# Patient Record
Sex: MALE | Race: WHITE | NOT HISPANIC OR LATINO | URBAN - METROPOLITAN AREA
[De-identification: names, ages, dates, MRNs, and addresses within clinical notes are randomized per-mention and may not be internally consistent; named-entity substitution may affect disease eponyms.]

---

## 2017-07-03 ENCOUNTER — EMERGENCY (EMERGENCY)
Facility: HOSPITAL | Age: 57
LOS: 0 days | Discharge: LEFT BEFORE DISCHARGE | End: 2017-07-04

## 2017-07-03 DIAGNOSIS — Z88.0 ALLERGY STATUS TO PENICILLIN: ICD-10-CM

## 2017-07-03 DIAGNOSIS — R07.89 OTHER CHEST PAIN: ICD-10-CM

## 2017-07-03 DIAGNOSIS — Z79.899 OTHER LONG TERM (CURRENT) DRUG THERAPY: ICD-10-CM

## 2017-07-03 DIAGNOSIS — F17.200 NICOTINE DEPENDENCE, UNSPECIFIED, UNCOMPLICATED: ICD-10-CM

## 2017-07-03 DIAGNOSIS — J20.9 ACUTE BRONCHITIS, UNSPECIFIED: ICD-10-CM

## 2018-09-07 ENCOUNTER — EMERGENCY (EMERGENCY)
Facility: HOSPITAL | Age: 58
LOS: 0 days | Discharge: HOME | End: 2018-09-07
Attending: EMERGENCY MEDICINE | Admitting: EMERGENCY MEDICINE

## 2018-09-07 VITALS
RESPIRATION RATE: 18 BRPM | HEART RATE: 95 BPM | HEIGHT: 71 IN | WEIGHT: 190.04 LBS | SYSTOLIC BLOOD PRESSURE: 153 MMHG | TEMPERATURE: 98 F | OXYGEN SATURATION: 98 % | DIASTOLIC BLOOD PRESSURE: 90 MMHG

## 2018-09-07 VITALS
TEMPERATURE: 98 F | DIASTOLIC BLOOD PRESSURE: 99 MMHG | OXYGEN SATURATION: 96 % | SYSTOLIC BLOOD PRESSURE: 157 MMHG | RESPIRATION RATE: 18 BRPM

## 2018-09-07 DIAGNOSIS — Z88.0 ALLERGY STATUS TO PENICILLIN: ICD-10-CM

## 2018-09-07 DIAGNOSIS — Z98.1 ARTHRODESIS STATUS: Chronic | ICD-10-CM

## 2018-09-07 DIAGNOSIS — J44.1 CHRONIC OBSTRUCTIVE PULMONARY DISEASE WITH (ACUTE) EXACERBATION: ICD-10-CM

## 2018-09-07 DIAGNOSIS — Z87.891 PERSONAL HISTORY OF NICOTINE DEPENDENCE: ICD-10-CM

## 2018-09-07 DIAGNOSIS — Z79.899 OTHER LONG TERM (CURRENT) DRUG THERAPY: ICD-10-CM

## 2018-09-07 DIAGNOSIS — Z98.1 ARTHRODESIS STATUS: ICD-10-CM

## 2018-09-07 DIAGNOSIS — R06.00 DYSPNEA, UNSPECIFIED: ICD-10-CM

## 2018-09-07 DIAGNOSIS — F41.9 ANXIETY DISORDER, UNSPECIFIED: ICD-10-CM

## 2018-09-07 DIAGNOSIS — Z79.51 LONG TERM (CURRENT) USE OF INHALED STEROIDS: ICD-10-CM

## 2018-09-07 LAB
ALBUMIN SERPL ELPH-MCNC: 4.3 G/DL — SIGNIFICANT CHANGE UP (ref 3.5–5.2)
ALP SERPL-CCNC: 81 U/L — SIGNIFICANT CHANGE UP (ref 30–115)
ALT FLD-CCNC: 15 U/L — SIGNIFICANT CHANGE UP (ref 0–41)
ANION GAP SERPL CALC-SCNC: 9 MMOL/L — SIGNIFICANT CHANGE UP (ref 7–14)
APTT BLD: 27.9 SEC — SIGNIFICANT CHANGE UP (ref 27–39.2)
AST SERPL-CCNC: 13 U/L — SIGNIFICANT CHANGE UP (ref 0–41)
BASE EXCESS BLDV CALC-SCNC: 3.5 MMOL/L — HIGH (ref -2–2)
BASOPHILS # BLD AUTO: 0.05 K/UL — SIGNIFICANT CHANGE UP (ref 0–0.2)
BASOPHILS NFR BLD AUTO: 0.4 % — SIGNIFICANT CHANGE UP (ref 0–1)
BILIRUB SERPL-MCNC: 0.4 MG/DL — SIGNIFICANT CHANGE UP (ref 0.2–1.2)
BUN SERPL-MCNC: 17 MG/DL — SIGNIFICANT CHANGE UP (ref 10–20)
CA-I SERPL-SCNC: 1.15 MMOL/L — SIGNIFICANT CHANGE UP (ref 1.12–1.3)
CALCIUM SERPL-MCNC: 8.8 MG/DL — SIGNIFICANT CHANGE UP (ref 8.5–10.1)
CHLORIDE SERPL-SCNC: 103 MMOL/L — SIGNIFICANT CHANGE UP (ref 98–110)
CO2 SERPL-SCNC: 28 MMOL/L — SIGNIFICANT CHANGE UP (ref 17–32)
CREAT SERPL-MCNC: 0.8 MG/DL — SIGNIFICANT CHANGE UP (ref 0.7–1.5)
EOSINOPHIL # BLD AUTO: 0.16 K/UL — SIGNIFICANT CHANGE UP (ref 0–0.7)
EOSINOPHIL NFR BLD AUTO: 1.4 % — SIGNIFICANT CHANGE UP (ref 0–8)
GAS PNL BLDV: 140 MMOL/L — SIGNIFICANT CHANGE UP (ref 136–145)
GAS PNL BLDV: SIGNIFICANT CHANGE UP
GLUCOSE SERPL-MCNC: 180 MG/DL — HIGH (ref 70–99)
HCO3 BLDV-SCNC: 29 MMOL/L — SIGNIFICANT CHANGE UP (ref 22–29)
HCT VFR BLD CALC: 47.5 % — SIGNIFICANT CHANGE UP (ref 42–52)
HCT VFR BLDA CALC: 53.4 % — HIGH (ref 34–44)
HGB BLD CALC-MCNC: 17.4 G/DL — SIGNIFICANT CHANGE UP (ref 14–18)
HGB BLD-MCNC: 16.3 G/DL — SIGNIFICANT CHANGE UP (ref 14–18)
HOROWITZ INDEX BLDV+IHG-RTO: 21 — SIGNIFICANT CHANGE UP
IMM GRANULOCYTES NFR BLD AUTO: 0.3 % — SIGNIFICANT CHANGE UP (ref 0.1–0.3)
INR BLD: 1.22 RATIO — SIGNIFICANT CHANGE UP (ref 0.65–1.3)
LACTATE BLDV-MCNC: 1.6 MMOL/L — SIGNIFICANT CHANGE UP (ref 0.5–1.6)
LYMPHOCYTES # BLD AUTO: 1.01 K/UL — LOW (ref 1.2–3.4)
LYMPHOCYTES # BLD AUTO: 9 % — LOW (ref 20.5–51.1)
MAGNESIUM SERPL-MCNC: 1.9 MG/DL — SIGNIFICANT CHANGE UP (ref 1.8–2.4)
MCHC RBC-ENTMCNC: 30 PG — SIGNIFICANT CHANGE UP (ref 27–31)
MCHC RBC-ENTMCNC: 34.3 G/DL — SIGNIFICANT CHANGE UP (ref 32–37)
MCV RBC AUTO: 87.5 FL — SIGNIFICANT CHANGE UP (ref 80–94)
MONOCYTES # BLD AUTO: 0.55 K/UL — SIGNIFICANT CHANGE UP (ref 0.1–0.6)
MONOCYTES NFR BLD AUTO: 4.9 % — SIGNIFICANT CHANGE UP (ref 1.7–9.3)
NEUTROPHILS # BLD AUTO: 9.48 K/UL — HIGH (ref 1.4–6.5)
NEUTROPHILS NFR BLD AUTO: 84 % — HIGH (ref 42.2–75.2)
NRBC # BLD: 0 /100 WBCS — SIGNIFICANT CHANGE UP (ref 0–0)
PCO2 BLDV: 44 MMHG — SIGNIFICANT CHANGE UP (ref 41–51)
PH BLDV: 7.42 — SIGNIFICANT CHANGE UP (ref 7.26–7.43)
PLATELET # BLD AUTO: 218 K/UL — SIGNIFICANT CHANGE UP (ref 130–400)
PO2 BLDV: 48 MMHG — HIGH (ref 20–40)
POTASSIUM BLDV-SCNC: 3.4 MMOL/L — SIGNIFICANT CHANGE UP (ref 3.3–5.6)
POTASSIUM SERPL-MCNC: 3.8 MMOL/L — SIGNIFICANT CHANGE UP (ref 3.5–5)
POTASSIUM SERPL-SCNC: 3.8 MMOL/L — SIGNIFICANT CHANGE UP (ref 3.5–5)
PROT SERPL-MCNC: 6.7 G/DL — SIGNIFICANT CHANGE UP (ref 6–8)
PROTHROM AB SERPL-ACNC: 13.2 SEC — HIGH (ref 9.95–12.87)
RBC # BLD: 5.43 M/UL — SIGNIFICANT CHANGE UP (ref 4.7–6.1)
RBC # FLD: 12.8 % — SIGNIFICANT CHANGE UP (ref 11.5–14.5)
SAO2 % BLDV: 88 % — SIGNIFICANT CHANGE UP
SODIUM SERPL-SCNC: 140 MMOL/L — SIGNIFICANT CHANGE UP (ref 135–146)
TROPONIN T SERPL-MCNC: <0.01 NG/ML — SIGNIFICANT CHANGE UP
WBC # BLD: 11.28 K/UL — HIGH (ref 4.8–10.8)
WBC # FLD AUTO: 11.28 K/UL — HIGH (ref 4.8–10.8)

## 2018-09-07 RX ORDER — IPRATROPIUM/ALBUTEROL SULFATE 18-103MCG
3 AEROSOL WITH ADAPTER (GRAM) INHALATION ONCE
Qty: 0 | Refills: 0 | Status: COMPLETED | OUTPATIENT
Start: 2018-09-07 | End: 2018-09-07

## 2018-09-07 RX ORDER — ALBUTEROL 90 UG/1
2 AEROSOL, METERED ORAL EVERY 6 HOURS
Qty: 0 | Refills: 0 | Status: DISCONTINUED | OUTPATIENT
Start: 2018-09-07 | End: 2018-09-07

## 2018-09-07 RX ADMIN — Medication 3 MILLILITER(S): at 13:05

## 2018-09-07 RX ADMIN — ALBUTEROL 2 PUFF(S): 90 AEROSOL, METERED ORAL at 14:46

## 2018-09-07 RX ADMIN — Medication 125 MILLIGRAM(S): at 13:05

## 2018-09-07 RX ADMIN — Medication 3 MILLILITER(S): at 13:22

## 2018-09-07 RX ADMIN — Medication 3 MILLILITER(S): at 12:45

## 2018-09-07 NOTE — ED PROVIDER NOTE - OBJECTIVE STATEMENT
57 yo M recently diagnosed with COPD 6 months ago (quit smoking 6 months ago) presenting with cough and shortness of breath x 3 days. States shortness of breath has been worsening since last night. Has been using Ventolin with no relief. Patient also reports itchy throat. States he was in [place] all weekend and was around a lot of smoke. Also reports sick contacts with people with +URI symptoms. No cp, fever, chills, abdominal pain, nausea, vomiting, diarrhea, back pain, urinary symptoms, headache, dizziness, paresthesias, or weakness.

## 2018-09-07 NOTE — ED ADULT NURSE NOTE - NSIMPLEMENTINTERV_GEN_ALL_ED
Implemented All Universal Safety Interventions:  [place] to call system. Call bell, personal items and telephone within reach. Instruct patient to call for assistance. Room bathroom lighting operational. Non-slip footwear when patient is off stretcher. Physically safe environment: no spills, clutter or unnecessary equipment. Stretcher in lowest position, wheels locked, appropriate side rails in place.

## 2018-09-07 NOTE — ED PROVIDER NOTE - MEDICAL DECISION MAKING DETAILS
much improved.  dx testing reviewed. In my opinion, out patient treatment and follow up is appropriate. Copies of all diagnostic studies were given to patient to aid in follow up.

## 2018-09-07 NOTE — ED PROVIDER NOTE - NS ED ROS FT
Review of Systems:  	•	CONSTITUTIONAL - no fever, no diaphoresis, no chills  	•	SKIN - no rash  	•	ENT - +itchy throat, no congestion  	•	RESPIRATORY - + shortness of breath, + cough  	•	CARDIAC - no chest pain, no palpitations  	•	GI - no abd pain, no nausea, no vomiting  	•	GENITO-URINARY - no dysuria; no hematuria, no increased urinary frequency  	•	MUSCULOSKELETAL - no joint paint, no swelling, no redness  	•	NEUROLOGIC - no weakness, no headache, no paresthesias, no LOC  	All other ROS are negative except as documented in HPI.

## 2018-09-07 NOTE — ED PROVIDER NOTE - PHYSICAL EXAMINATION
VITAL SIGNS: I have reviewed nursing notes and confirm.  CONSTITUTIONAL: Well-developed; well-nourished; in no acute distress.  SKIN: Skin exam is warm and dry, no acute rash.  HEAD: Normocephalic; atraumatic.  EYES: PERRL, EOM intact; conjunctiva and sclera clear.  ENT: No nasal discharge; airway clear. Oropharynx clear.   NECK: Supple; non tender.  CARD: S1, S2 normal; no murmurs, gallops, or rubs. Regular rate and rhythm.  RESP: +Intermittent wheezing with poor airway entry. No accessory muscle use.   ABD: Normal bowel sounds; soft; non-distended; non-tender.   EXT: Normal ROM. No edema. No calf tenderness.   LYMPH: No acute cervical adenopathy.  NEURO: Alert, oriented. Grossly unremarkable. No focal deficits.  PSYCH: Cooperative, appropriate.

## 2018-12-17 ENCOUNTER — EMERGENCY (EMERGENCY)
Facility: HOSPITAL | Age: 58
LOS: 0 days | Discharge: HOME | End: 2018-12-17
Attending: EMERGENCY MEDICINE | Admitting: EMERGENCY MEDICINE

## 2018-12-17 VITALS
SYSTOLIC BLOOD PRESSURE: 191 MMHG | HEIGHT: 78 IN | HEART RATE: 82 BPM | TEMPERATURE: 96 F | DIASTOLIC BLOOD PRESSURE: 105 MMHG | RESPIRATION RATE: 18 BRPM | WEIGHT: 195.11 LBS

## 2018-12-17 VITALS
HEART RATE: 78 BPM | RESPIRATION RATE: 19 BRPM | OXYGEN SATURATION: 95 % | SYSTOLIC BLOOD PRESSURE: 171 MMHG | TEMPERATURE: 98 F | DIASTOLIC BLOOD PRESSURE: 100 MMHG

## 2018-12-17 DIAGNOSIS — Z79.51 LONG TERM (CURRENT) USE OF INHALED STEROIDS: ICD-10-CM

## 2018-12-17 DIAGNOSIS — Z98.1 ARTHRODESIS STATUS: Chronic | ICD-10-CM

## 2018-12-17 DIAGNOSIS — Z79.52 LONG TERM (CURRENT) USE OF SYSTEMIC STEROIDS: ICD-10-CM

## 2018-12-17 DIAGNOSIS — Z79.2 LONG TERM (CURRENT) USE OF ANTIBIOTICS: ICD-10-CM

## 2018-12-17 DIAGNOSIS — J44.1 CHRONIC OBSTRUCTIVE PULMONARY DISEASE WITH (ACUTE) EXACERBATION: ICD-10-CM

## 2018-12-17 DIAGNOSIS — J44.9 CHRONIC OBSTRUCTIVE PULMONARY DISEASE, UNSPECIFIED: ICD-10-CM

## 2018-12-17 DIAGNOSIS — Z79.899 OTHER LONG TERM (CURRENT) DRUG THERAPY: ICD-10-CM

## 2018-12-17 DIAGNOSIS — Z88.0 ALLERGY STATUS TO PENICILLIN: ICD-10-CM

## 2018-12-17 DIAGNOSIS — Z87.891 PERSONAL HISTORY OF NICOTINE DEPENDENCE: ICD-10-CM

## 2018-12-17 DIAGNOSIS — R06.02 SHORTNESS OF BREATH: ICD-10-CM

## 2018-12-17 RX ORDER — ALBUTEROL 90 UG/1
1 AEROSOL, METERED ORAL ONCE
Qty: 0 | Refills: 0 | Status: COMPLETED | OUTPATIENT
Start: 2018-12-17 | End: 2018-12-17

## 2018-12-17 RX ORDER — AZITHROMYCIN 500 MG/1
1 TABLET, FILM COATED ORAL
Qty: 4 | Refills: 0
Start: 2018-12-17 | End: 2018-12-20

## 2018-12-17 RX ORDER — IPRATROPIUM/ALBUTEROL SULFATE 18-103MCG
3 AEROSOL WITH ADAPTER (GRAM) INHALATION ONCE
Qty: 0 | Refills: 0 | Status: COMPLETED | OUTPATIENT
Start: 2018-12-17 | End: 2018-12-17

## 2018-12-17 RX ORDER — ALBUTEROL 90 UG/1
2 AEROSOL, METERED ORAL
Qty: 1 | Refills: 0
Start: 2018-12-17 | End: 2018-12-26

## 2018-12-17 RX ORDER — AZITHROMYCIN 500 MG/1
500 TABLET, FILM COATED ORAL ONCE
Qty: 0 | Refills: 0 | Status: COMPLETED | OUTPATIENT
Start: 2018-12-17 | End: 2018-12-17

## 2018-12-17 RX ADMIN — AZITHROMYCIN 500 MILLIGRAM(S): 500 TABLET, FILM COATED ORAL at 17:41

## 2018-12-17 RX ADMIN — ALBUTEROL 1 PUFF(S): 90 AEROSOL, METERED ORAL at 17:27

## 2018-12-17 RX ADMIN — Medication 50 MILLIGRAM(S): at 17:28

## 2018-12-17 RX ADMIN — Medication 3 MILLILITER(S): at 17:22

## 2018-12-17 NOTE — ED PROVIDER NOTE - PHYSICAL EXAMINATION
CONSTITUTIONAL: Well-developed; well-nourished; in no acute distress  SKIN: warm, dry  HEAD: Normocephalic; atraumatic  EYES: PERRL, EOMI, no conjunctival erythema  ENT: No nasal discharge; airway clear, mucous membranes moist  NECK: Supple; non tender, FROM  CARD: +S1, S2 no murmurs, gallops, or rubs. Regular rate and rhythm. radial 2+  RESP: b/l wheezing with poor air entry b/l, tachypneic but no use of accessory muscles for respiration   EXT: moves all extremities, ambulates wo assistance No clubbing, cyanosis or edema.   NEURO: Alert, oriented, grossly unremarkable, no focal deficits, cn ii-xii grossly intact  PSYCH: Cooperative, appropriate

## 2018-12-17 NOTE — ED PROVIDER NOTE - OBJECTIVE STATEMENT
57yo m pmhx copd, former smoker presents cc sob, copd exacerbation x 3 weeks. pt states he has been using his ventolin inhaler multiple times a day but ran out last night. cannot recall inciting event. reports increased sputum production during this time period as well. no fevers, nausea, vomiting.

## 2018-12-17 NOTE — ED PROVIDER NOTE - PROGRESS NOTE DETAILS
pt reevaluated, symptoms improved, lungs ctabl. Patient to be discharged from ED. Any available test results were discussed with patient and/or family. Verbal instructions given, including instructions to return to ED immediately for any new, worsening, or concerning symptoms. Patient endorsed understanding. Written discharge instructions additionally given, including follow-up plan.

## 2018-12-17 NOTE — ED PROVIDER NOTE - MEDICAL DECISION MAKING DETAILS
58 male with COPD here for bronchitis / med refill, got imaging and will discharge to outpatient management

## 2018-12-17 NOTE — ED ADULT NURSE NOTE - NSIMPLEMENTINTERV_GEN_ALL_ED
Implemented All Universal Safety Interventions:  [place] to call system. Call bell, personal items and telephone within reach. Instruct patient to call for assistance. Room bathroom lighting operational. Non-slip footwear when patient is off stretcher. Physically safe environment: no spills, clutter or unnecessary equipment. Stretcher in lowest position, wheels locked, appropriate side rails in place.

## 2018-12-17 NOTE — ED PROVIDER NOTE - NSFOLLOWUPINSTRUCTIONS_ED_ALL_ED_FT
follow up with your pulmonologist and your primary care doctor in 1-3 days    Chronic Obstructive Pulmonary Disease    Chronic obstructive pulmonary disease (COPD) is a lung condition in which airflow from the lungs is limited. Causes include smoking, secondhand smoke exposure, genetics, or recurrent infections. Take all medicines (inhaled or pills) as directed by your health care provider. Avoid exposure to irritants such as smoke, chemicals, and fumes that aggravate your breathing.    If you are a smoker, the most important thing that you can do is stop smoking. Continuing to smoke will cause further lung damage and breathing trouble. Ask your health care provider for help with quitting smoking.    SEEK IMMEDIATE MEDICAL CARE IF YOU HAVE ANY OF THE FOLLOWING SYMPTOMS: shortness of breath at rest or when talking, bluish discoloration of lips, skin, fever, worsening cough, unexplained chest pain, or lightheadedness/dizziness.     Shortness of breath    Shortness of breath (dyspnea) means you have trouble breathing and could indicate a medical problem. Causes include lung disease, heart disease, low amount of red blood cells (anemia), poor physical fitness, being overweight, smoking, etc. Your health care provider today may not be able to find a cause for your shortness of breath after your exam. In this case, it is important to have a follow-up exam with your primary care physician as instructed. If medicines were prescribed, take them as directed for the full length of time directed. Refrain from tobacco products.    SEEK IMMEDIATE MEDICAL CARE IF YOU HAVE ANY OF THE FOLLOWING SYMPTOMS: worsening shortness of breath, chest pain, back pain, abdominal pain, fever, coughing up blood, lightheadedness/dizziness.

## 2018-12-17 NOTE — ED PROVIDER NOTE - NS ED ROS FT
General: No fevers, chills, nausea, vomiting  Eyes:  No visual changes, eye pain or discharge.  ENMT:  No hearing changes, pain,   Cardiac:  No chest pain, +SOB, no edema.  Respiratory:  +cough  GI:  No nausea, vomiting, or abdominal pain.  :  No dysuria  MS:  No  back pain.  Neuro:  No headache or weakness.  No LOC.  Skin:  No skin rash.   Endocrine: No history of thyroid disease or diabetes.

## 2018-12-17 NOTE — ED PROVIDER NOTE - ATTENDING CONTRIBUTION TO CARE
I personally evaluated the patient. I reviewed the Resident’s or Physician Assistant’s note (as assigned above), and agree with the findings and plan except as documented in my note.     58 male here for evaluation of cough and bronchitis. 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 No fever or chills. Ran out of his rescue inhaler.     PE: male in no distress. CHEST: normal work of breathing. ronchi bilaterally. no accessory muscle use. CV: pulses intact. SKIN: no pallor no diaphoresis    Impression: bronchitis COPD    Plan: imaging, reevaluation, antibiotics, steroids, outpatient management

## 2018-12-17 NOTE — ED PROVIDER NOTE - CARE PLAN
Principal Discharge DX:	COPD exacerbation  Secondary Diagnosis:	COPD (chronic obstructive pulmonary disease)

## 2018-12-18 PROBLEM — F41.9 ANXIETY DISORDER, UNSPECIFIED: Chronic | Status: ACTIVE | Noted: 2018-09-07

## 2018-12-18 PROBLEM — J44.9 CHRONIC OBSTRUCTIVE PULMONARY DISEASE, UNSPECIFIED: Chronic | Status: ACTIVE | Noted: 2018-09-07

## 2019-08-05 ENCOUNTER — EMERGENCY (EMERGENCY)
Facility: HOSPITAL | Age: 59
LOS: 0 days | Discharge: HOME | End: 2019-08-05
Attending: EMERGENCY MEDICINE | Admitting: EMERGENCY MEDICINE
Payer: MEDICARE

## 2019-08-05 VITALS
RESPIRATION RATE: 20 BRPM | DIASTOLIC BLOOD PRESSURE: 88 MMHG | SYSTOLIC BLOOD PRESSURE: 138 MMHG | HEART RATE: 78 BPM | TEMPERATURE: 98 F | OXYGEN SATURATION: 100 %

## 2019-08-05 VITALS
RESPIRATION RATE: 18 BRPM | DIASTOLIC BLOOD PRESSURE: 100 MMHG | OXYGEN SATURATION: 95 % | WEIGHT: 190.04 LBS | TEMPERATURE: 97 F | HEART RATE: 86 BPM | SYSTOLIC BLOOD PRESSURE: 141 MMHG

## 2019-08-05 DIAGNOSIS — J44.9 CHRONIC OBSTRUCTIVE PULMONARY DISEASE, UNSPECIFIED: ICD-10-CM

## 2019-08-05 DIAGNOSIS — Z98.1 ARTHRODESIS STATUS: Chronic | ICD-10-CM

## 2019-08-05 DIAGNOSIS — Z88.0 ALLERGY STATUS TO PENICILLIN: ICD-10-CM

## 2019-08-05 DIAGNOSIS — Z87.891 PERSONAL HISTORY OF NICOTINE DEPENDENCE: ICD-10-CM

## 2019-08-05 DIAGNOSIS — R06.02 SHORTNESS OF BREATH: ICD-10-CM

## 2019-08-05 LAB
ALBUMIN SERPL ELPH-MCNC: 4.4 G/DL — SIGNIFICANT CHANGE UP (ref 3.5–5.2)
ALP SERPL-CCNC: 77 U/L — SIGNIFICANT CHANGE UP (ref 30–115)
ALT FLD-CCNC: 19 U/L — SIGNIFICANT CHANGE UP (ref 0–41)
ANION GAP SERPL CALC-SCNC: 9 MMOL/L — SIGNIFICANT CHANGE UP (ref 7–14)
AST SERPL-CCNC: 13 U/L — SIGNIFICANT CHANGE UP (ref 0–41)
BILIRUB SERPL-MCNC: 0.3 MG/DL — SIGNIFICANT CHANGE UP (ref 0.2–1.2)
BUN SERPL-MCNC: 24 MG/DL — HIGH (ref 10–20)
CALCIUM SERPL-MCNC: 9.3 MG/DL — SIGNIFICANT CHANGE UP (ref 8.5–10.1)
CHLORIDE SERPL-SCNC: 108 MMOL/L — SIGNIFICANT CHANGE UP (ref 98–110)
CO2 SERPL-SCNC: 29 MMOL/L — SIGNIFICANT CHANGE UP (ref 17–32)
CREAT SERPL-MCNC: 0.8 MG/DL — SIGNIFICANT CHANGE UP (ref 0.7–1.5)
GLUCOSE SERPL-MCNC: 101 MG/DL — HIGH (ref 70–99)
HCT VFR BLD CALC: 45.6 % — SIGNIFICANT CHANGE UP (ref 42–52)
HGB BLD-MCNC: 15.7 G/DL — SIGNIFICANT CHANGE UP (ref 14–18)
MCHC RBC-ENTMCNC: 31 PG — SIGNIFICANT CHANGE UP (ref 27–31)
MCHC RBC-ENTMCNC: 34.4 G/DL — SIGNIFICANT CHANGE UP (ref 32–37)
MCV RBC AUTO: 89.9 FL — SIGNIFICANT CHANGE UP (ref 80–94)
NRBC # BLD: 0 /100 WBCS — SIGNIFICANT CHANGE UP (ref 0–0)
PLATELET # BLD AUTO: 205 K/UL — SIGNIFICANT CHANGE UP (ref 130–400)
POTASSIUM SERPL-MCNC: 4.2 MMOL/L — SIGNIFICANT CHANGE UP (ref 3.5–5)
POTASSIUM SERPL-SCNC: 4.2 MMOL/L — SIGNIFICANT CHANGE UP (ref 3.5–5)
PROT SERPL-MCNC: 6.3 G/DL — SIGNIFICANT CHANGE UP (ref 6–8)
RBC # BLD: 5.07 M/UL — SIGNIFICANT CHANGE UP (ref 4.7–6.1)
RBC # FLD: 12.6 % — SIGNIFICANT CHANGE UP (ref 11.5–14.5)
SODIUM SERPL-SCNC: 146 MMOL/L — SIGNIFICANT CHANGE UP (ref 135–146)
WBC # BLD: 6.41 K/UL — SIGNIFICANT CHANGE UP (ref 4.8–10.8)
WBC # FLD AUTO: 6.41 K/UL — SIGNIFICANT CHANGE UP (ref 4.8–10.8)

## 2019-08-05 PROCEDURE — 71046 X-RAY EXAM CHEST 2 VIEWS: CPT | Mod: 26

## 2019-08-05 PROCEDURE — 99284 EMERGENCY DEPT VISIT MOD MDM: CPT

## 2019-08-05 RX ORDER — IPRATROPIUM/ALBUTEROL SULFATE 18-103MCG
3 AEROSOL WITH ADAPTER (GRAM) INHALATION ONCE
Refills: 0 | Status: COMPLETED | OUTPATIENT
Start: 2019-08-05 | End: 2019-08-05

## 2019-08-05 RX ORDER — ALBUTEROL 90 UG/1
1 AEROSOL, METERED ORAL ONCE
Refills: 0 | Status: COMPLETED | OUTPATIENT
Start: 2019-08-05 | End: 2019-08-05

## 2019-08-05 RX ORDER — MAGNESIUM SULFATE 500 MG/ML
2 VIAL (ML) INJECTION ONCE
Refills: 0 | Status: COMPLETED | OUTPATIENT
Start: 2019-08-05 | End: 2019-08-05

## 2019-08-05 RX ADMIN — Medication 50 GRAM(S): at 19:47

## 2019-08-05 RX ADMIN — ALBUTEROL 1 PUFF(S): 90 AEROSOL, METERED ORAL at 21:27

## 2019-08-05 RX ADMIN — Medication 3 MILLILITER(S): at 19:46

## 2019-08-05 RX ADMIN — Medication 125 MILLIGRAM(S): at 19:46

## 2019-08-05 NOTE — ED PROVIDER NOTE - PROGRESS NOTE DETAILS
Pt lungs clear. Results d/w patient and family and copies of results provided.  Pt instructed to return if any worsening symptoms or concerns.  Discussed with patient follow up and care plan. They verbalize understanding all discussed and all questions answered..

## 2019-08-05 NOTE — ED PROVIDER NOTE - CLINICAL SUMMARY MEDICAL DECISION MAKING FREE TEXT BOX
59yM hx of COPD  on ventolin only  (pulm Dr weems),  p/w  SOb wheezing x 3 days -  not relieved with his  Inhaler.  no chest pain leg pain swelling   or PE risk factors CXR no ptx no opacity  REsp  b/l wheezing  -   labs reviewed,   steroids and mg  given  pt  feels much better   -  dcd with steroids and referral for  pulmonary

## 2019-08-05 NOTE — ED PROVIDER NOTE - OBJECTIVE STATEMENT
59 year old male past medical history of COPD states having exacerbation and states that he has been seen by PMD was given inhaler and medrol dose pack last week but states not helping.

## 2019-08-05 NOTE — ED PROVIDER NOTE - NSFOLLOWUPINSTRUCTIONS_ED_ALL_ED_FT
Follow up with your primary care doctor and your Pulmonologist in 1-2 days     Chronic Obstructive Pulmonary Disease  ImageChronic obstructive pulmonary disease (COPD) is a long-term (chronic) condition that affects the lungs. COPD is a general term that can be used to describe many different lung problems that cause lung swelling (inflammation) and limit airflow, including chronic bronchitis and emphysema. If you have COPD, your lung function will probably never return to normal. In most cases, it gets worse over time. However, there are steps you can take to slow the progression of the disease and improve your quality of life.    What are the causes?  This condition may be caused by:    Smoking. This is the most common cause.  Certain genes passed down through families.    What increases the risk?  The following factors may make you more likely to develop this condition:    Secondhand smoke from cigarettes, pipes, or cigars.  Exposure to chemicals and other irritants such as fumes and dust in the work environment.  Chronic lung conditions or infections.    What are the signs or symptoms?  Symptoms of this condition include:    Shortness of breath, especially during physical activity.  Chronic cough with a large amount of thick mucus. Sometimes the cough may not have any mucus (dry cough).  Wheezing.  Rapid breaths.  Gray or bluish discoloration (cyanosis) of the skin, especially in your fingers, toes, or lips.  Feeling tired (fatigue).  Weight loss.  Chest tightness.  Frequent infections.  Episodes when breathing symptoms become much worse (exacerbations).  Swelling in the ankles, feet, or legs. This may occur in later stages of the disease.    How is this diagnosed?  This condition is diagnosed based on:    Your medical history.  A physical exam.    You may also have tests, including:    Lung (pulmonary) function tests. This may include a spirometry test, which measures your ability to exhale properly.  Chest X-ray.  CT scan.  Blood tests.    How is this treated?  This condition may be treated with:    Medicines. These may include inhaled rescue medicines to treat acute exacerbations as well as long-term, or maintenance, medicines to prevent flare-ups of COPD.    Bronchodilators help treat COPD by dilating the airways to allow increased airflow and make your breathing more comfortable.  Steroids can reduce airway inflammation and help prevent exacerbations.    Smoking cessation. If you smoke, your health care provider may ask you to quit, and may also recommend therapy or replacement products to help you quit.  Pulmonary rehabilitation. This may involve working with a team of health care providers and specialists, such as respiratory, occupational, and physical therapists.  Exercise and physical activity. These are beneficial for nearly all people with COPD.  Nutrition therapy to gain weight, if you are underweight.  Oxygen. Supplemental oxygen therapy is only helpful if you have a low oxygen level in your blood (hypoxemia).  Lung surgery or transplant.  Palliative care. This is to help people with COPD feel comfortable when treatment is no longer working.    Follow these instructions at home:  Medicines     Take over-the-counter and prescription medicines (inhaled or pills) only as told by your health care provider.  Talk to your health care provider before taking any cough or allergy medicines. You may need to avoid certain medicines that dry out your airways.  Lifestyle     If you are a smoker, the most important thing that you can do is to stop smoking. Do not use any products that contain nicotine or tobacco, such as cigarettes and e-cigarettes. If you need help quitting, ask your health care provider. Continuing to smoke will cause the disease to progress faster.  Avoid exposure to things that irritate your lungs, such as smoke, chemicals, and fumes.  Stay active, but balance activity with periods of rest. Exercise and physical activity will help you maintain your ability to do things you want to do.  Learn and use relaxation techniques to manage stress and to control your breathing.  Get the right amount of sleep and get quality sleep. Most adults need 7 or more hours per night.  Eat healthy foods. Eating smaller, more frequent meals and resting before meals may help you maintain your strength.  Controlled breathing     Learn and use controlled breathing techniques as directed by your health care provider. Controlled breathing techniques include:    Pursed lip breathing. Start by breathing in (inhaling) through your nose for 1 second. Then, purse your lips as if you were going to whistle and breathe out (exhale) through the pursed lips for 2 seconds.  Diaphragmatic breathing. Start by putting one hand on your abdomen just above your waist. Inhale slowly through your nose. The hand on your abdomen should move out. Then purse your lips and exhale slowly. You should be able to feel the hand on your abdomen moving in as you exhale.    Controlled coughing     Learn and use controlled coughing to clear mucus from your lungs. Controlled coughing is a series of short, progressive coughs. The steps of controlled coughing are:    Lean your head slightly forward.    Breathe in deeply using diaphragmatic breathing.    Try to hold your breath for 3 seconds.    Keep your mouth slightly open while coughing twice.    Spit any mucus out into a tissue.    Rest and repeat the steps once or twice as needed.      General instructions     Make sure you receive all the vaccines that your health care provider recommends, especially the pneumococcal and influenza vaccines. Preventing infection and hospitalization is very important when you have COPD.  Use oxygen therapy and pulmonary rehabilitation if directed to by your health care provider. If you require home oxygen therapy, ask your health care provider whether you should purchase a pulse oximeter to measure your oxygen level at home.  Work with your health care provider to develop a COPD action plan. This will help you know what steps to take if your condition gets worse.  Keep other chronic health conditions under control as told by your health care provider.  Avoid extreme temperature and humidity changes.  Avoid contact with people who have an illness that spreads from person to person (is contagious), such as viral infections or pneumonia.  Keep all follow-up visits as told by your health care provider. This is important.  Contact a health care provider if:  You are coughing up more mucus than usual.  There is a change in the color or thickness of your mucus.  Your breathing is more labored than usual.  Your breathing is faster than usual.  You have difficulty sleeping.  You need to use your rescue medicines or inhalers more often than expected.  You have trouble doing routine activities such as getting dressed or walking around the house.  Get help right away if:  You have shortness of breath while you are resting.  You have shortness of breath that prevents you from:    Being able to talk.  Performing your usual physical activities.    You have chest pain lasting longer than 5 minutes.  Your skin color is more blue (cyanotic) than usual.  You measure low oxygen saturations for longer than 5 minutes with a pulse oximeter.  You have a fever.  You feel too tired to breathe normally.  Summary  Chronic obstructive pulmonary disease (COPD) is a long-term (chronic) condition that affects the lungs.  Your lung function will probably never return to normal. In most cases, it gets worse over time. However, there are steps you can take to slow the progression of the disease and improve your quality of life.  Treatment for COPD may include taking medicines, quitting smoking, pulmonary rehabilitation, and changes to diet and exercise. As the disease progresses, you may need oxygen therapy, a lung transplant, or palliative care.  To help manage your condition, do not smoke, avoid exposure to things that irritate your lungs, stay up to date on all vaccines, and follow your health care provider's instructions for taking medicines.  This information is not intended to replace advice given to you by your health care provider. Make sure you discuss any questions you have with your health care provider.

## 2019-08-05 NOTE — ED PROVIDER NOTE - PHYSICAL EXAMINATION
Physical Exam    Vital Signs: I have reviewed the initial vital signs.  Constitutional: well-nourished, appears stated age, no acute distress  Eyes: Conjunctiva pink, Sclera clear, PERRLA, EOMI.  Cardiovascular: S1 and S2, regular rate, regular rhythm, well-perfused extremities, radial pulses equal and 2+  Respiratory: unlabored respiratory effort, + bilaterally wheezing and diminished at bases.  Gastrointestinal: soft, non-tender abdomen, no pulsatile mass, normal bowl sounds  Musculoskeletal: supple neck, no lower extremity edema, no midline tenderness  Integumentary: warm, dry, no rash  Neurologic: awake, alert, cranial nerves II-XII grossly intact, extremities’ motor and sensory functions grossly intact  Psychiatric: appropriate mood, appropriate affect

## 2019-08-05 NOTE — ED PROVIDER NOTE - CARE PROVIDER_API CALL
Abel Hawkins)  Critical Care Medicine; Internal Medicine; Pulmonary Disease; Sleep Medicine  [address] [place] [address]  Phone: [phone]  Fax: [phone]  Follow Up Time: 1-3 Days

## 2019-08-05 NOTE — ED ADULT NURSE NOTE - NSIMPLEMENTINTERV_GEN_ALL_ED
Implemented All Fall with Harm Risk Interventions:  [place] to call system. Call bell, personal items and telephone within reach. Instruct patient to call for assistance. Room bathroom lighting operational. Non-slip footwear when patient is off stretcher. Physically safe environment: no spills, clutter or unnecessary equipment. Stretcher in lowest position, wheels locked, appropriate side rails in place. Provide visual cue, wrist band, yellow gown, etc. Monitor gait and stability. Monitor for mental status changes and reorient to person, place, and time. Review medications for side effects contributing to fall risk. Reinforce activity limits and safety measures with patient and family. Provide visual clues: red socks.

## 2019-10-02 PROBLEM — Z00.00 ENCOUNTER FOR PREVENTIVE HEALTH EXAMINATION: Status: ACTIVE | Noted: 2019-10-02

## 2019-10-09 ENCOUNTER — APPOINTMENT (OUTPATIENT)
Dept: CARDIOTHORACIC SURGERY | Facility: CLINIC | Age: 59
End: 2019-10-09
Payer: MEDICARE

## 2019-10-09 VITALS
HEIGHT: 70 IN | HEART RATE: 80 BPM | DIASTOLIC BLOOD PRESSURE: 88 MMHG | SYSTOLIC BLOOD PRESSURE: 139 MMHG | RESPIRATION RATE: 13 BRPM | OXYGEN SATURATION: 96 % | TEMPERATURE: 98.4 F | BODY MASS INDEX: 27.2 KG/M2 | WEIGHT: 190 LBS

## 2019-10-09 DIAGNOSIS — Z82.49 FAMILY HISTORY OF ISCHEMIC HEART DISEASE AND OTHER DISEASES OF THE CIRCULATORY SYSTEM: ICD-10-CM

## 2019-10-09 DIAGNOSIS — Z87.898 PERSONAL HISTORY OF OTHER SPECIFIED CONDITIONS: ICD-10-CM

## 2019-10-09 DIAGNOSIS — R94.2 ABNORMAL RESULTS OF PULMONARY FUNCTION STUDIES: ICD-10-CM

## 2019-10-09 DIAGNOSIS — Z87.09 PERSONAL HISTORY OF OTHER DISEASES OF THE RESPIRATORY SYSTEM: ICD-10-CM

## 2019-10-09 DIAGNOSIS — R91.8 OTHER NONSPECIFIC ABNORMAL FINDING OF LUNG FIELD: ICD-10-CM

## 2019-10-09 DIAGNOSIS — R25.2 CRAMP AND SPASM: ICD-10-CM

## 2019-10-09 DIAGNOSIS — Z78.9 OTHER SPECIFIED HEALTH STATUS: ICD-10-CM

## 2019-10-09 DIAGNOSIS — Z82.5 FAMILY HISTORY OF ASTHMA AND OTHER CHRONIC LOWER RESPIRATORY DISEASES: ICD-10-CM

## 2019-10-09 PROCEDURE — 99205 OFFICE O/P NEW HI 60 MIN: CPT

## 2019-10-09 RX ORDER — FLUTICASONE FUROATE, UMECLIDINIUM BROMIDE AND VILANTEROL TRIFENATATE 100; 62.5; 25 UG/1; UG/1; UG/1
100-62.5-25 POWDER RESPIRATORY (INHALATION)
Refills: 0 | Status: ACTIVE | COMMUNITY

## 2019-10-14 NOTE — PHYSICAL EXAM
[General Appearance - Alert] : alert [Sclera] : the sclera and conjunctiva were normal [General Appearance - In No Acute Distress] : in no acute distress [PERRL With Normal Accommodation] : pupils were equal in size, round, and reactive to light [Extraocular Movements] : extraocular movements were intact [Heart Rate And Rhythm] : heart rate was normal and rhythm regular [Heart Sounds] : normal S1 and S2 [Heart Sounds Gallop] : no gallops [Murmurs] : no murmurs [Heart Sounds Pericardial Friction Rub] : no pericardial rub [Deep Tendon Reflexes (DTR)] : deep tendon reflexes were 2+ and symmetric [Sensation] : the sensory exam was normal to light touch and pinprick [No Focal Deficits] : no focal deficits [Oriented To Time, Place, And Person] : oriented to person, place, and time [Affect] : the affect was normal [Impaired Insight] : insight and judgment were intact [] : no respiratory distress [Respiration, Rhythm And Depth] : normal respiratory rhythm and effort [FreeTextEntry1] : diminished

## 2019-10-14 NOTE — DATA REVIEWED
[FreeTextEntry1] : Pulmonary Function Analysis - 10/1/19\par FEV1:     Pre: 42\par               Post: 41\par DLCO:  93\par \par ================================================================================\par EXAM:  PET CT FDG SKULL BASE - 9/13/19\par \par \par IMPRESSION:\par \par 1. FDG-avid 2.2 x 2.3 cm spiculated mass is identified in the left upper lobe adjacent to the aortic arch, representing malignancy.\par \par 2. No other evidence of abnormal focal hypermetabolic uptake.\par \par 3. 5 mm nodule in the right lower lobe. The lesion is below the resolution of PET scan.\par \par 4. FDG non-avid 1.7 x 2.2 cm benign hypodense right adrenal adenoma.\par \par \par \par Diagnostic confidence level used in this report:\par \par Consistent with/compatible with or no modifier - greater than 98%\par Most likely - greater than 90%\par Likely/probably - greater than 75%\par Possibly 50%\par Less likely - less than 25%\par Unlikely - less than 5%\par \par \par F-18 FDG PET/CT SCAN FROM THE SKULL BASE TO THE MID THIGHS\par \par AGENT:\par \par 13.6 mCi F18-FDG, IV via the left antecubital vein.\par \par HISTORY:\par \par 59 year old male with suspicious left upper lobe spiculated mass seen on the recent CT. 30 pack year smoking history, quit smoking 8 months ago.\par \par EXAM CATEGORY:\par \par Solitary lung nodule\par \par TECHNIQUE:\par \par 60 minutes following injection of the radiopharmaceutical, PET/CT imaging was performed from the skull base to thighs. Fasting serum glucose was 88 mg/dL prior to injection. Oral or IV contrast was not given. All SUV values reported represent maximum SUV (SUV max) unless otherwise specified.\par \par COMPARISON:\par \par 8/31/2019 CT scan.\par \par SOFT TISSUE FINDINGS:\par \par HEAD AND NECK:\par \par Diffuse mild FDG activity in the parotid glands may represent postinflammatory change vs. physiologic activity. Otherwise, normal physiologic FDG uptake is noted in the visualized portion of the brain and soft tissues of the head and neck.\par \par CT images demonstrate no evidence of cervical adenopathy. The thyroid gland is unremarkable. The visualized portion of the paranasal sinuses are aerated.\par \par CHEST:\par \par FDG-avid 2.2 x 2.3 cm spiculated mass is identified in the left upper lobe adjacent to the aortic arch with SUV 12.4 (image 71), representing malignancy.\par \par A 5 mm nodule in the right lower lobe (image 99) and punctate subpleural nodule in the right upper lobe anteriorly (image 66) are again seen. The central tracheobronchial tree is patent. Emphysematous changes are present.\par \par Normal FDG activity is visualized in the right lung, mediastinum and chest wall.\par \par CT images demonstrate FDG non-avid right paratracheal, precarinal and aortopulmonary window lymph nodes measuring up to 1 cm in short axis. There is no evidence of hilar or axillary adenopathy. The heart is not enlarged. There is no evidence of pneumothorax, pleural or pericardial effusion.\par \par ABDOMEN AND PELVIS:\par \par Physiologic FDG activity is visualized in all regions.\par \par CT images demonstrate no evidence of significant adenopathy within the abdomen and pelvis. The liver and spleen are not enlarged. The gallbladder and left adrenal gland are unremarkable. There is no evidence of pancreatic mass on unenhanced CT. No evidence of obstructing stone or hydronephrosis is seen. FDG non-avid 1.7 x 2.2 cm hypodense right adrenal adenoma, splenule, enlarged prostate gland and bilateral small inguinal hernias containing fat are present.\par \par PROXIMAL THIGHS:\par \par Normal FDG activity is present in the soft tissues of the proximal thighs.\par \par SKELETAL FINDINGS:\par \par Normal FDG activity is noted in the axial skeleton and visualized portion of the appendicular skeleton.\par \par CT images demonstrate C5-C7 spinal hardware and degenerative changes in the spine. There is no evidence of aggressive lytic or blastic lesion.\par \par \par ====================================================================================\par EXAM:  THORAX^RR_CT_RED_MALE_CHEST (ADULT) - 8/31/19\par \par \par IMPRESSION:\par \par Spiculated mass medial left upper lobe which may be adherent to the aortic arch.\par 5 mm nodule right lower lobe. Metastatic focus not excluded. It is possible this can be assessed with PET/CT.\par Borderline size precarinal adenopathy. No obvious hilar nodes.\par Benign right adrenal mass.\par \par \par \par \par MASS LEFT UPPER LOBE SUSPICIOUS FOR NEOPLASM.\par \par CT CHEST WITHOUT IV CONTRAST\par \par CLINICAL INDICATION: History of COPD. No known primary. History of smoking. Not further specified.\par \par COMPARISON: No previous\par \par TECHNIQUE: Noncontrast chest CT was performed. Multiplanar CT and HRCT images were reviewed.\par \par FINDINGS:\par \par LUNGS, AIRWAYS: Trachea main and visualized segmental bronchi patent. Mild airway disease.\par Mild centrilobular emphysema. No interstitial disease.\par A spiculated mass in the left upper lobe. It is adjacent to the aortic arch. It measures 2.1 x 1.6 cm. It is suspicious for neoplasm.\par Punctate pleural-based nodule right upper lobe anteriorly in image 15.\par There is a 5 mm nodule in the anterior aspect of the right upper lobe in series 5 image 46. This may represent a metastatic focus. It is possible it may be assessed by PET/CT.\par \par \par PLEURA: No pleural fluid.\par \par MEDIASTINUM, FRANKIE, LYMPH NODES: No enlarged axillary adenopathy. Precarinal adenopathy short axis X mm. No subcarinal nodes. No obvious hilar nodes. Thoracic esophagus unremarkable.\par \par HEART AND VESSELS: Heart size normal. No pericardial fluid. Ascending aorta is normal in caliber at 3.7 cm. Descending aorta also normal 2.4 cm. The left upper lobe mass is adjacent and possibly adherent to the aortic arch.\par \par UPPER ABDOMEN: Right adrenal nodule better seen in the coronal measuring 1.4 cm. Its attenuation values are -11 which would favor benign etiology. Left adrenal normal\par \par BONES AND SOFT TISSUES: Soft tissues normal. Lower cervical spine surgery. No aggressive bony lesion.\par \par LOWER NECK: No abnormality.\par

## 2019-10-14 NOTE — ASSESSMENT
[FreeTextEntry1] : Mr. CHERI WELCH is a 59 year M who presents to our office today for a consultation for a lung mass  referred to our office by Dr. Alireza Fernandez. This is spiculated and PET positive, the suspicion is high for lung cancer.  His images were reviewed in multi-disciplinary conference and he was thought to represent a very difficult IR biopsy, with which I would agree.  To that end he would need to be a surgical biopsy (wedge resection vs direct VATS needle biopsy) and completion lobectomy if necessary/indicated.  Radiologic images reviewed. Surgical treatment options discussed at length with the patient and his ex-wife in accompaniment. He states that he would like to include his wife in decision making as she is an RN at [employer]. \par At present he will need a cardiopulmonary stress test with VO2 max, cardiac clearance with Dr. Jamie Turner, pre-surgical testing, all lof which we will help to facilitate, and once completed he will proceed to the OR on Nov 7th for a Robotic Left thorascopic lung biopsy, possible resection.  Of note, the patient shows fairly diminished FEV1 but preserved DLCO.  I performed stair climb with him in the office during which he bounded up 3 flights of stairs, did not exhibit shortness of breath, and offered to do the stairs again 2 at a time if that would help my decision making.  In spite of the diminished FEV1, he objectively presents adequate physical reserve for resection.  He does note that his breathing is much improved since addition of inhaler.\par

## 2019-10-14 NOTE — HISTORY OF PRESENT ILLNESS
[FreeTextEntry1] : 59 year M who presents to our office today accompanied by his ex wife for a consultation for a lung mass found on PET scan referred to our office by Dr. Fernandez. He states that he was informed that he "may have lung cancer." He states that approximately 3 years ago he had an episode of difficulty breathing and was evaluated by Dr. Cano who placed him on inhalers, which he reports using up/finishing completely in 2 days time. During that time, he reports having quit smoking for approximately 10 months before restarting again. A low dose CT scan had been ordered for him by Dr. Cano, but the patient reports not getting that test done at that time because he "felt fine." He was recently evaluated by Dr. Fernandez, with whom he reports getting a CT chest done after being unable to obtain an appointment with Dr. Cano. He then had a PET scan and PFTs done. \par He denies any cough, SOB, SOB on exertion, fevers, skin rashes, pruritus, recent weight loss, hemoptysis, or CP. He reports feeling "great" especially since being started on his Trelegy Ellipta inhaler and endorses having completed a 1 mile run yesterday with no difficulty.\par His PM/SxH is significant for low back pain, muscle cramps, insomnia, past use of cocaine x 20 years having quit 10 years ago, cervical vertebral fusion and recently, multiple bouts of bronchitis. 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 He is a former smoker having smoked 1.5 ppd/30 yrs, quit on 1/1/19. \par \par ECOG/WHO/Zubrod - 0 - Fully active, able to carry out all pre-disease performance without restrictions\par \par His healthcare team is as follows:\par PMD: Daniel Valera\par Cardio: none\par Pulm: Rebecca\par EPS: none\par Hem/onc: none\par Renal: none\par Endocrine: none\par GI: none\par Neurosx: Jj Cesar [Diabetes Mellitus] : no Diabetes Melllitus [Dialysis] : no dialysis [Dyslipidemia] : no dyslipidemia [Infectious Endocarditis] : no infectious endocarditis [Hypertension] : no hypertension [Home Oxygen] : no home oxygen use [Inhaled Medication Therapy] : no inhaled medication therapy [Sleep Apnea] : no sleep apnea [Liver Disease] : no liver disease [Immunocompromise Present] : not immunocompromised [Unresponsive Neurologic State] : not in a unresponsive neurologic state [Peripheral Artery Disease] : no peripheral artery disease [Syncope] : no syncope [Prior CVA] : with no prior CVA [Cerebrovascular Disease] : no cerebrovascular disease [CVD TIA] : no CVD Tia [Prior Myocardial Infarction] : No prior myocardial infarction [Prior Heart Failure] : no prior heart failure

## 2019-10-14 NOTE — CONSULT LETTER
[Dear  ___] : Dear  [unfilled], [Consult Letter:] : I had the pleasure of evaluating your patient, [unfilled]. [Consult Closing:] : Thank you very much for allowing me to participate in the care of this patient.  If you have any questions, please do not hesitate to contact me. [Sincerely,] : Sincerely, [FreeTextEntry2] : Alireza Fernandez [FreeTextEntry1] : This is a very pleasant 59 year old male who presents to our office today for a consultation for a lung mass. This is spiculated and PET positive, the suspicion is high for lung cancer.  His images were reviewed in multi-disciplinary conference and he was thought to represent a very difficult IR biopsy, with which I would agree.  To that end he would need to be a surgical biopsy (wedge resection vs direct VATS needle biopsy) and completion lobectomy if necessary/indicated.  Radiologic images reviewed. Surgical treatment options discussed at length with the patient and his ex-wife in accompaniment. He states that he would like to include his wife in decision making as she is an RN at [employer]. \par \par At present he will need a cardiopulmonary stress test with VO2 max, cardiac clearance with Dr. Jamie Turner, pre-surgical testing, all lof which we will help to facilitate, and once completed he will proceed to the OR on Nov 7th for a Robotic Left thorascopic lung biopsy, possible resection.  Of note, the patient shows fairly diminished FEV1 but preserved DLCO.  I performed stair climb with him in the office during which he bounded up 3 flights of stairs, did not exhibit shortness of breath, and offered to do the stairs again 2 at a time if that would help my decision making.  In spite of the diminished FEV1, he objectively presents adequate physical reserve for resection.  He does note that his breathing is much improved since addition of inhaler. [FreeTextEntry3] : Baltazar Monk M.D.\par Chief, Division of Thoracic Surgery\par Department of Cardiothoracic Surgery\par

## 2020-09-10 ENCOUNTER — INPATIENT (INPATIENT)
Facility: HOSPITAL | Age: 60
LOS: 1 days | Discharge: HOME | End: 2020-09-12
Attending: INTERNAL MEDICINE | Admitting: INTERNAL MEDICINE
Payer: MEDICARE

## 2020-09-10 VITALS
SYSTOLIC BLOOD PRESSURE: 165 MMHG | HEART RATE: 83 BPM | TEMPERATURE: 97 F | RESPIRATION RATE: 19 BRPM | WEIGHT: 184.09 LBS | DIASTOLIC BLOOD PRESSURE: 100 MMHG | OXYGEN SATURATION: 97 %

## 2020-09-10 DIAGNOSIS — Z98.1 ARTHRODESIS STATUS: Chronic | ICD-10-CM

## 2020-09-10 LAB
ALBUMIN SERPL ELPH-MCNC: 4.1 G/DL — SIGNIFICANT CHANGE UP (ref 3.5–5.2)
ALP SERPL-CCNC: 77 U/L — SIGNIFICANT CHANGE UP (ref 30–115)
ALT FLD-CCNC: 11 U/L — SIGNIFICANT CHANGE UP (ref 0–41)
ANION GAP SERPL CALC-SCNC: 14 MMOL/L — SIGNIFICANT CHANGE UP (ref 7–14)
AST SERPL-CCNC: 11 U/L — SIGNIFICANT CHANGE UP (ref 0–41)
BASOPHILS # BLD AUTO: 0.02 K/UL — SIGNIFICANT CHANGE UP (ref 0–0.2)
BASOPHILS NFR BLD AUTO: 0.3 % — SIGNIFICANT CHANGE UP (ref 0–1)
BILIRUB SERPL-MCNC: <0.2 MG/DL — SIGNIFICANT CHANGE UP (ref 0.2–1.2)
BUN SERPL-MCNC: 26 MG/DL — HIGH (ref 10–20)
CALCIUM SERPL-MCNC: 9.6 MG/DL — SIGNIFICANT CHANGE UP (ref 8.5–10.1)
CHLORIDE SERPL-SCNC: 100 MMOL/L — SIGNIFICANT CHANGE UP (ref 98–110)
CO2 SERPL-SCNC: 30 MMOL/L — SIGNIFICANT CHANGE UP (ref 17–32)
CREAT SERPL-MCNC: 1.1 MG/DL — SIGNIFICANT CHANGE UP (ref 0.7–1.5)
EOSINOPHIL # BLD AUTO: 0.18 K/UL — SIGNIFICANT CHANGE UP (ref 0–0.7)
EOSINOPHIL NFR BLD AUTO: 2.7 % — SIGNIFICANT CHANGE UP (ref 0–8)
GLUCOSE SERPL-MCNC: 111 MG/DL — HIGH (ref 70–99)
HCT VFR BLD CALC: 38.4 % — LOW (ref 42–52)
HGB BLD-MCNC: 12.6 G/DL — LOW (ref 14–18)
IMM GRANULOCYTES NFR BLD AUTO: 0.5 % — HIGH (ref 0.1–0.3)
LYMPHOCYTES # BLD AUTO: 0.84 K/UL — LOW (ref 1.2–3.4)
LYMPHOCYTES # BLD AUTO: 12.8 % — LOW (ref 20.5–51.1)
MAGNESIUM SERPL-MCNC: 1.7 MG/DL — LOW (ref 1.8–2.4)
MCHC RBC-ENTMCNC: 27.8 PG — SIGNIFICANT CHANGE UP (ref 27–31)
MCHC RBC-ENTMCNC: 32.8 G/DL — SIGNIFICANT CHANGE UP (ref 32–37)
MCV RBC AUTO: 84.6 FL — SIGNIFICANT CHANGE UP (ref 80–94)
MONOCYTES # BLD AUTO: 0.54 K/UL — SIGNIFICANT CHANGE UP (ref 0.1–0.6)
MONOCYTES NFR BLD AUTO: 8.2 % — SIGNIFICANT CHANGE UP (ref 1.7–9.3)
NEUTROPHILS # BLD AUTO: 4.96 K/UL — SIGNIFICANT CHANGE UP (ref 1.4–6.5)
NEUTROPHILS NFR BLD AUTO: 75.5 % — HIGH (ref 42.2–75.2)
NRBC # BLD: 0 /100 WBCS — SIGNIFICANT CHANGE UP (ref 0–0)
PLATELET # BLD AUTO: 261 K/UL — SIGNIFICANT CHANGE UP (ref 130–400)
POTASSIUM SERPL-MCNC: 3.3 MMOL/L — LOW (ref 3.5–5)
POTASSIUM SERPL-SCNC: 3.3 MMOL/L — LOW (ref 3.5–5)
PROT SERPL-MCNC: 6.8 G/DL — SIGNIFICANT CHANGE UP (ref 6–8)
RAPID RVP RESULT: SIGNIFICANT CHANGE UP
RBC # BLD: 4.54 M/UL — LOW (ref 4.7–6.1)
RBC # FLD: 13.8 % — SIGNIFICANT CHANGE UP (ref 11.5–14.5)
SARS-COV-2 RNA SPEC QL NAA+PROBE: SIGNIFICANT CHANGE UP
SODIUM SERPL-SCNC: 144 MMOL/L — SIGNIFICANT CHANGE UP (ref 135–146)
TROPONIN T SERPL-MCNC: <0.01 NG/ML — SIGNIFICANT CHANGE UP
WBC # BLD: 6.57 K/UL — SIGNIFICANT CHANGE UP (ref 4.8–10.8)
WBC # FLD AUTO: 6.57 K/UL — SIGNIFICANT CHANGE UP (ref 4.8–10.8)

## 2020-09-10 PROCEDURE — 70450 CT HEAD/BRAIN W/O DYE: CPT | Mod: 26

## 2020-09-10 PROCEDURE — 99285 EMERGENCY DEPT VISIT HI MDM: CPT

## 2020-09-10 PROCEDURE — 70553 MRI BRAIN STEM W/O & W/DYE: CPT | Mod: 26

## 2020-09-10 RX ORDER — MECLIZINE HCL 12.5 MG
50 TABLET ORAL ONCE
Refills: 0 | Status: COMPLETED | OUTPATIENT
Start: 2020-09-10 | End: 2020-09-10

## 2020-09-10 RX ORDER — LEVETIRACETAM 250 MG/1
1000 TABLET, FILM COATED ORAL ONCE
Refills: 0 | Status: COMPLETED | OUTPATIENT
Start: 2020-09-10 | End: 2020-09-10

## 2020-09-10 RX ORDER — ALBUTEROL 90 UG/1
0 AEROSOL, METERED ORAL
Qty: 0 | Refills: 0 | DISCHARGE

## 2020-09-10 RX ORDER — SODIUM CHLORIDE 9 MG/ML
1000 INJECTION, SOLUTION INTRAVENOUS ONCE
Refills: 0 | Status: DISCONTINUED | OUTPATIENT
Start: 2020-09-10 | End: 2020-09-10

## 2020-09-10 RX ORDER — POTASSIUM CHLORIDE 20 MEQ
40 PACKET (EA) ORAL ONCE
Refills: 0 | Status: COMPLETED | OUTPATIENT
Start: 2020-09-10 | End: 2020-09-10

## 2020-09-10 RX ORDER — DIAZEPAM 5 MG
0 TABLET ORAL
Qty: 0 | Refills: 0 | DISCHARGE

## 2020-09-10 RX ORDER — LEVETIRACETAM 250 MG/1
500 TABLET, FILM COATED ORAL EVERY 12 HOURS
Refills: 0 | Status: DISCONTINUED | OUTPATIENT
Start: 2020-09-11 | End: 2020-09-11

## 2020-09-10 RX ORDER — METOCLOPRAMIDE HCL 10 MG
10 TABLET ORAL ONCE
Refills: 0 | Status: COMPLETED | OUTPATIENT
Start: 2020-09-10 | End: 2020-09-10

## 2020-09-10 RX ORDER — FLUTICASONE FUROATE, UMECLIDINIUM BROMIDE AND VILANTEROL TRIFENATATE 200; 62.5; 25 UG/1; UG/1; UG/1
1 POWDER RESPIRATORY (INHALATION)
Qty: 0 | Refills: 0 | DISCHARGE

## 2020-09-10 RX ORDER — MAGNESIUM SULFATE 500 MG/ML
2 VIAL (ML) INJECTION ONCE
Refills: 0 | Status: COMPLETED | OUTPATIENT
Start: 2020-09-10 | End: 2020-09-10

## 2020-09-10 RX ADMIN — LEVETIRACETAM 400 MILLIGRAM(S): 250 TABLET, FILM COATED ORAL at 15:44

## 2020-09-10 RX ADMIN — Medication 50 GRAM(S): at 15:35

## 2020-09-10 RX ADMIN — Medication 125 MILLIGRAM(S): at 15:43

## 2020-09-10 RX ADMIN — Medication 40 MILLIEQUIVALENT(S): at 15:44

## 2020-09-10 RX ADMIN — Medication 50 MILLIGRAM(S): at 14:14

## 2020-09-10 RX ADMIN — Medication 10 MILLIGRAM(S): at 14:14

## 2020-09-10 NOTE — ED PROVIDER NOTE - CLINICAL SUMMARY MEDICAL DECISION MAKING FREE TEXT BOX
pt aware of all results, understands transfer to [place], neurosurgery aware of pt, medical admitting team aware of pt and admission, mg and K repleted, pt no changed in exam, NIH 0, COVID sent , admitted as discussed and agreed with pt.

## 2020-09-10 NOTE — ED PROVIDER NOTE - PROGRESS NOTE DETAILS
neurosurg kaylin lewis aware of pt and xfer. ED Attending ALISIA Yepez  Extensive conversation was had with pt regarding ct head, understands findings and plan for admission, keppra and steroids given, neurosurgery aware of pt. medical admitting team aware of pt and admission. pt does not have pmd, reports retired.

## 2020-09-10 NOTE — H&P ADULT - NSHPLABSRESULTS_GEN_ALL_CORE
12.6   6.57  )-----------( 261      ( 10 Sep 2020 14:41 )             38.4     09-10    144  |  100  |  26<H>  ----------------------------<  111<H>  3.3<L>   |  30  |  1.1    Ca    9.6      10 Sep 2020 14:41  Mg     1.7     09-10    TPro  6.8  /  Alb  4.1  /  TBili  <0.2  /  DBili  x   /  AST  11  /  ALT  11  /  AlkPhos  77  09-10    Troponin T, Serum: <0.01 ng/mL (09.10.20 @ 14:41)    < from: CT Head No Cont (09.10.20 @ 14:34) >    Ill-defined right superior temporal mass with surrounding vasogenic edema and local mass effect with approximately 0.9 cm midline shift to the left. Follow-up MRI with and without contrast can be obtained for further evaluation.    < end of copied text >

## 2020-09-10 NOTE — H&P ADULT - ATTENDING COMMENTS
60 year old male with hx of COPD not on home O2 and lung cancer diagnosed in September 2019 s/p lobectomy/chemo/rad presents from [facility] with new onset headaches starting 3 days ago.  Headache is right sided, mild, and non-radiating.  At [facility] he had a CT head done showing an ill-defined superior temporal mass with surrounding vasogenic edema with 0.9 cm midline shift to the left.  He denies any changes in vision, denies any weakness. Reports nl PET scan 2 wks ago.    Denies CP, SOB, N/V/D/C/AP, cough, F, chills, dizziness, new focal weakness, vision changes, dysuria, or urinary symptoms, blood in stool.    ROS: all systems unremarkable except as above.     Gen: NAD, AA0x3  CV: nl S1 S2  Resp: decreased BS b/l  GI: NT/ND/S +BS  MS: no c/c/e, +pulses  Neuro: nonfocal, +reflexes    EKG - nonspecific changes (my read)  Chart and consultant notes personally reviewed.  Care Discussed with Consultants/Other Providers/ Housestaff [ x] YES [ ] NO   Radiology, labs, old records personally reviewed.    60 year old male with hx of COPD not on home O2 and lung cancer diagnosed in September s/p chemo/rad presents from [facility] with new onset headaches starting 3 days ago likely secondary to lung metastasis    # Brain mets w/ midline shift and brain compression ?Herniation  - keppra 500 bid and decadron 4mg q6h IV   -d/w pt's oupt rad-onc Dr. Coto who recommends resection of the lesion followed by outpt radiation  -d/w NS: plan for OR Thursday  -check echo and cardio eval for preop clearance    # COPD  - takes trelegy at home, starting symbicort here    # DVT PPX: heparin sc  # GI PPX: protonix qd  # Diet: regular  # FULL CODE

## 2020-09-10 NOTE — H&P ADULT - HISTORY OF PRESENT ILLNESS
60 year old male with hx of COPD not on home O2 and lung cancer diagnosed in September s/p chemo/rad presents from [facility] with new onset headaches starting 3 days ago.  Headache is right sided, mild, and non-radiating.  At [facility] he had a CT head done showing an ill-defined superior temporal mass with surrounding vasogenic edema with 0.9 cm midline shift to the left.  He denies any changes in vision, denies any weakness.    At [facility] he received a 1L LR bolus, Keppra 1g, solumedrol 125 mg, and had mag/K repleted.    Triage vitals: /100  HR 83  RR 19  Temp 97.4  SpO2 97% on room air

## 2020-09-10 NOTE — H&P ADULT - NSHPREVIEWOFSYSTEMS_GEN_ALL_CORE
CONSTITUTIONAL: No weakness, fevers or chills  EYES/ENT: No visual changes;  No vertigo or throat pain   NECK: No pain or stiffness  RESPIRATORY: No cough, wheezing, hemoptysis; No shortness of breath  CARDIOVASCULAR: No chest pain or palpitations  GASTROINTESTINAL: No abdominal or epigastric pain. No nausea, vomiting, or hematemesis; No diarrhea or constipation. No melena or hematochezia.  GENITOURINARY: No dysuria, frequency or hematuria  NEUROLOGICAL: No numbness or weakness, mild right sided headache nonradiating  SKIN: No itching, rashes

## 2020-09-10 NOTE — H&P ADULT - ASSESSMENT
60 year old male with hx of COPD not on home O2 and lung cancer diagnosed in [place]/p chemo[place] starting 3 days ago likely secondary to lung metastasis    # New onset headaches likely secondary to lung cancer  - no changes in vision, no muscle weakness  - CT showing ill-defineid right superior temporal mass with surrounding vasogenic edema and local mass effect // 0.9 cm right to left midline shift  - patient is aware of findings and likely diagnosis  - f/u heme onc, patient was being followed by Dr. Coto and Dr. Nunez at [facility]  - d/w neurosurgery, starting keppra 500 bid and decadron 4mg q6h IV and MRI w/wo     # COPD  - takes trelegy at home, starting symbicort here    # PLAN: f/u neurosurgery and heme onc    # DVT PPX: heparin sc  # GI PPX: protonix qd  # Diet: regular  # FULL CODE

## 2020-09-10 NOTE — ED PROVIDER NOTE - PLAN OF CARE
Plan: EKG, CXR, labs, ivf, pain medication, ct head, COVID swab, reassess. Plan: EKG, CXR, labs, pain medication, ct head, COVID swab, reassess.

## 2020-09-10 NOTE — H&P ADULT - NSHPPHYSICALEXAM_GEN_ALL_CORE
General: WN/WD NAD  Neurology: A&Ox3, nonfocal, EASTMAN x 4  Head:  Normocephalic, atraumatic  ENT:  Mucosa moist, no ulcerations  Neck:  Supple, no sinuses or palpable masses  Lymphatic:  No palpable cervical, supraclavicular, axillary or inguinal adenopathy  Respiratory: CTA B/L  CV: RRR, S1S2, no murmur  Abdominal: Soft, NT, ND no palpable mass  MSK: No edema

## 2020-09-10 NOTE — ED PROVIDER NOTE - NS ED ROS FT
Constitutional: (-) fever, (-) chills  Eyes: (-) visual changes  ENT: (-) nasal congestions  Cardiovascular: (-) chest pain, (-) syncope  Respiratory: (-) cough, (-) shortness of breath, (-) dyspnea,   Gastrointestinal: (-) vomiting, (-) diarrhea, (-)nausea,  Musculoskeletal: (-) neck pain, (-) back pain, (-) joint pain,  Integumentary: (-) rash, (-) edema, (-) bruises  Neurological: (+) headache, (-) loc, (-) dizziness, (-) tingling, (-)numbness,  Psychiatric: (-) hallucinations, (-)nervousness, (-)depression, (-)SI/HI  Peripheral Vascular: (-) leg swelling  :  (-)dysuria,  (-) hematuria  Allergic/Immunologic: (-) pruritus Constitutional: (-) fever, (-) chills  Eyes: (-) visual changes  ENT: (-) nasal congestions  Cardiovascular: (-) chest pain, (-) syncope  Respiratory: (-) cough, (-) shortness of breath, (-) dyspnea,   Gastrointestinal: (-) vomiting, (-) diarrhea, (-)nausea,  Musculoskeletal: (-) neck pain, (-) back pain, (-) joint pain,  Integumentary: (-) rash, (-) edema, (-) bruises  Neurological: (+) headache, (-) loc, (+) dizziness, (-) tingling, (-)numbness,  Psychiatric: (-) hallucinations, (-)nervousness, (-)depression, (-)SI/HI  Peripheral Vascular: (-) leg swelling  :  (-)dysuria,  (-) hematuria  Allergic/Immunologic: (-) pruritus

## 2020-09-10 NOTE — ED ADULT NURSE REASSESSMENT NOTE - NS ED NURSE REASSESS COMMENT FT1
Report was given to [place] ED, patient was transferred via stretcher, no distress ,Family at the bedside

## 2020-09-10 NOTE — ED PROVIDER NOTE - OBJECTIVE STATEMENT
59 yo male, pmh of lung ca in remission s/p normal pet scan 2 weeks ago, presents to ed for right sided ha. present for 2 weeks, mild, aching, no radiation, a/w dizziness, loss of taste and weight loss. denies fever, chills, neck pain, visual changes, nvd, dysuria.

## 2020-09-10 NOTE — ED PROVIDER NOTE - CARE PLAN
Assessment and plan of treatment:	Plan: EKG, CXR, labs, ivf, pain medication, ct head, COVID swab, reassess. Assessment and plan of treatment:	Plan: EKG, CXR, labs, pain medication, ct head, COVID swab, reassess. Principal Discharge DX:	Brain tumor  Assessment and plan of treatment:	Plan: EKG, CXR, labs, pain medication, ct head, COVID swab, reassess.  Secondary Diagnosis:	Hypomagnesemia  Secondary Diagnosis:	Hypokalemia

## 2020-09-10 NOTE — CONSULT NOTE ADULT - SUBJECTIVE AND OBJECTIVE BOX
HPI:  60 year old male with hx of COPD not on home O2 and lung cancer diagnosed in September s/p chemo/rad presents from [facility] with new onset headaches starting 3 days ago.  Headache is right sided, mild, and non-radiating.  At [facility] he had a CT head done showing an ill-defined superior temporal mass with surrounding vasogenic edema with 0.9 cm midline shift to the left.  He denies any changes in vision, denies any weakness.    At [facility] he received a 1L LR bolus, Keppra 1g, solumedrol 125 mg, and had mag/K repleted.    Triage vitals: /100  HR 83  RR 19  Temp 97.4  SpO2 97% on room air (10 Sep 2020 21:02)      PAST MEDICAL & SURGICAL HISTORY:  Anxiety  COPD (chronic obstructive pulmonary disease)  H/O spinal fusion      Home Medications:  ibuprofen:  (10 Sep 2020 21:01)  Trelegy Ellipta inhalation powder: 1 puff(s) inhaled once a day (10 Sep 2020 21:01)      Allergies    penicillin (Hives)    Intolerances    ROS:  [X] A ten-point review of systems is negative except as noted   [  ] Due to altered mental status/intubation, subjective information were not able to be obtained from the patient. History was obtained, to the extent possible, from review of the chart and collateral sources of information    MEDICATIONS  (STANDING):  budesonide 160 MICROgram(s)/formoterol 4.5 MICROgram(s) Inhaler 2 Puff(s) Inhalation two times a day  dexAMETHasone  Injectable 4 milliGRAM(s) IV Push every 6 hours  heparin   Injectable 5000 Unit(s) SubCutaneous every 8 hours  pantoprazole    Tablet 40 milliGRAM(s) Oral every 24 hours    MEDICATIONS  (PRN):      ICU Vital Signs Last 24 Hrs  T(C): 36.1 (10 Sep 2020 15:59), Max: 36.3 (10 Sep 2020 13:59)  T(F): 96.9 (10 Sep 2020 15:59), Max: 97.4 (10 Sep 2020 13:59)  HR: 72 (10 Sep 2020 15:59) (72 - 83)  BP: 169/87 (10 Sep 2020 15:59) (165/100 - 169/87)  BP(mean): --  ABP: --  ABP(mean): --  RR: 19 (10 Sep 2020 13:59) (19 - 19)  SpO2: 100% (10 Sep 2020 15:59) (97% - 100%)      I&O's Detail      CBC Full  -  ( 10 Sep 2020 14:41 )  WBC Count : 6.57 K/uL  RBC Count : 4.54 M/uL  Hemoglobin : 12.6 g/dL  Hematocrit : 38.4 %  Platelet Count - Automated : 261 K/uL  Mean Cell Volume : 84.6 fL  Mean Cell Hemoglobin : 27.8 pg  Mean Cell Hemoglobin Concentration : 32.8 g/dL  Auto Neutrophil # : 4.96 K/uL  Auto Lymphocyte # : 0.84 K/uL  Auto Monocyte # : 0.54 K/uL  Auto Eosinophil # : 0.18 K/uL  Auto Basophil # : 0.02 K/uL  Auto Neutrophil % : 75.5 %  Auto Lymphocyte % : 12.8 %  Auto Monocyte % : 8.2 %  Auto Eosinophil % : 2.7 %  Auto Basophil % : 0.3 %    09-10    144  |  100  |  26<H>  ----------------------------<  111<H>  3.3<L>   |  30  |  1.1    Ca    9.6      10 Sep 2020 14:41  Mg     1.7     09-10    TPro  6.8  /  Alb  4.1  /  TBili  <0.2  /  DBili  x   /  AST  11  /  ALT  11  /  AlkPhos  77  09-10    CARDIAC MARKERS ( 10 Sep 2020 14:41 )  x     / <0.01 ng/mL / x     / x     / x              AAOX3. Verbal function intact  tongue midline, facial motions symmetric  PERRLA, EOMI  Pronator Drift:   Finger to Nose intact  Motor: MAEx4, 5/5 power in b/l UE and LE  Sensation: intact to touch in all extremities    Imaging:  < from: CT Head No Cont (09.10.20 @ 14:34) >  Ill-defined right superior temporal mass with surrounding vasogenic edema and local mass effect with approximately 0.9 cm midline shift to the left. Follow-up MRI with and without contrast can be obtained for further evaluation.    < end of copied text >    Assessment:  Brain Mass    Plan:  MRI of the Brain +/- gado with brain lab protocol  Decadron 4q6  Keppra 500 q12  Metastatic Work up
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
## 2020-09-11 ENCOUNTER — TRANSCRIPTION ENCOUNTER (OUTPATIENT)
Age: 60
End: 2020-09-11

## 2020-09-11 LAB
ALBUMIN SERPL ELPH-MCNC: 3.9 G/DL — SIGNIFICANT CHANGE UP (ref 3.5–5.2)
ALP SERPL-CCNC: 76 U/L — SIGNIFICANT CHANGE UP (ref 30–115)
ALT FLD-CCNC: 10 U/L — SIGNIFICANT CHANGE UP (ref 0–41)
ANION GAP SERPL CALC-SCNC: 9 MMOL/L — SIGNIFICANT CHANGE UP (ref 7–14)
AST SERPL-CCNC: 10 U/L — SIGNIFICANT CHANGE UP (ref 0–41)
BASOPHILS # BLD AUTO: 0 K/UL — SIGNIFICANT CHANGE UP (ref 0–0.2)
BASOPHILS NFR BLD AUTO: 0 % — SIGNIFICANT CHANGE UP (ref 0–1)
BILIRUB SERPL-MCNC: <0.2 MG/DL — SIGNIFICANT CHANGE UP (ref 0.2–1.2)
BUN SERPL-MCNC: 21 MG/DL — HIGH (ref 10–20)
CALCIUM SERPL-MCNC: 9.5 MG/DL — SIGNIFICANT CHANGE UP (ref 8.5–10.1)
CHLORIDE SERPL-SCNC: 103 MMOL/L — SIGNIFICANT CHANGE UP (ref 98–110)
CO2 SERPL-SCNC: 29 MMOL/L — SIGNIFICANT CHANGE UP (ref 17–32)
CREAT SERPL-MCNC: 0.9 MG/DL — SIGNIFICANT CHANGE UP (ref 0.7–1.5)
EOSINOPHIL # BLD AUTO: 0 K/UL — SIGNIFICANT CHANGE UP (ref 0–0.7)
EOSINOPHIL NFR BLD AUTO: 0 % — SIGNIFICANT CHANGE UP (ref 0–8)
GLUCOSE SERPL-MCNC: 153 MG/DL — HIGH (ref 70–99)
HCT VFR BLD CALC: 38.8 % — LOW (ref 42–52)
HCV AB S/CO SERPL IA: 0.04 COI — SIGNIFICANT CHANGE UP
HCV AB SERPL-IMP: SIGNIFICANT CHANGE UP
HGB BLD-MCNC: 12.9 G/DL — LOW (ref 14–18)
IMM GRANULOCYTES NFR BLD AUTO: 0.2 % — SIGNIFICANT CHANGE UP (ref 0.1–0.3)
LYMPHOCYTES # BLD AUTO: 0.41 K/UL — LOW (ref 1.2–3.4)
LYMPHOCYTES # BLD AUTO: 8 % — LOW (ref 20.5–51.1)
MAGNESIUM SERPL-MCNC: 2.1 MG/DL — SIGNIFICANT CHANGE UP (ref 1.8–2.4)
MCHC RBC-ENTMCNC: 27.9 PG — SIGNIFICANT CHANGE UP (ref 27–31)
MCHC RBC-ENTMCNC: 33.2 G/DL — SIGNIFICANT CHANGE UP (ref 32–37)
MCV RBC AUTO: 83.8 FL — SIGNIFICANT CHANGE UP (ref 80–94)
MONOCYTES # BLD AUTO: 0.09 K/UL — LOW (ref 0.1–0.6)
MONOCYTES NFR BLD AUTO: 1.8 % — SIGNIFICANT CHANGE UP (ref 1.7–9.3)
NEUTROPHILS # BLD AUTO: 4.62 K/UL — SIGNIFICANT CHANGE UP (ref 1.4–6.5)
NEUTROPHILS NFR BLD AUTO: 90 % — HIGH (ref 42.2–75.2)
NRBC # BLD: 0 /100 WBCS — SIGNIFICANT CHANGE UP (ref 0–0)
PLATELET # BLD AUTO: 261 K/UL — SIGNIFICANT CHANGE UP (ref 130–400)
POTASSIUM SERPL-MCNC: 3.7 MMOL/L — SIGNIFICANT CHANGE UP (ref 3.5–5)
POTASSIUM SERPL-SCNC: 3.7 MMOL/L — SIGNIFICANT CHANGE UP (ref 3.5–5)
PROT SERPL-MCNC: 6.5 G/DL — SIGNIFICANT CHANGE UP (ref 6–8)
RBC # BLD: 4.63 M/UL — LOW (ref 4.7–6.1)
RBC # FLD: 13.8 % — SIGNIFICANT CHANGE UP (ref 11.5–14.5)
SODIUM SERPL-SCNC: 141 MMOL/L — SIGNIFICANT CHANGE UP (ref 135–146)
WBC # BLD: 5.13 K/UL — SIGNIFICANT CHANGE UP (ref 4.8–10.8)
WBC # FLD AUTO: 5.13 K/UL — SIGNIFICANT CHANGE UP (ref 4.8–10.8)

## 2020-09-11 PROCEDURE — 99223 1ST HOSP IP/OBS HIGH 75: CPT | Mod: AI

## 2020-09-11 RX ORDER — DEXAMETHASONE 0.5 MG/5ML
1 ELIXIR ORAL
Qty: 40 | Refills: 0
Start: 2020-09-11 | End: 2020-09-20

## 2020-09-11 RX ORDER — IBUPROFEN 200 MG
0 TABLET ORAL
Qty: 0 | Refills: 0 | DISCHARGE

## 2020-09-11 RX ADMIN — LEVETIRACETAM 420 MILLIGRAM(S): 250 TABLET, FILM COATED ORAL at 05:53

## 2020-09-11 NOTE — CONSULT NOTE ADULT - ASSESSMENT
* Patient-based characteristics (Functional capacity)  Patient is able to achieve more than 4 MET (walk 4 blocks, climb 2 flights of stairs, etc...)          Y [X] / N []    High-risk patient features:  - Recent (<30 days) or active MI          Y [] / N [X]  - Unstable or severe angina          Y [] / N [X]  - Decompensated heart failure, or worsening or new-onset heart failure          Y [] / N [X]  - Severe valvular disease          Y [] / N [X]  - Significant arrhythmia (Tachy- or Bradyarrhythmia)          Y [] / N [X]    * Surgery/Procedure-based characteristics (Type of surgery)  - Low-risk procedure (outpatient procedure, elective, endoscopy, etc...)          Y [x] / N []  - Elevated or Moderate-risk procedure (Inpatient)          Y [x] / N []  - High-risk procedure (urgent/emergent procedure, Intrathoracic, vascular, etc...)          Y [] / N []    * Revised Cardiac Risk Index (RCRI)  1- History of ischemic heart disease          Y [] / N [X]  2- History of congestive heart failure          Y [] / N [X]  3- History of stroke/TIA          Y [] / N [X]  4- History of insulin-dependent diabetes          Y [] / N [X]  5- Chronic kidney disease (Cr >2mg/dL)          Y [] / N [X]  6- Undergoing suprainguinal vascular, intraperitoneal, or intrathoracic surgery          Y [] / N [X]    Class I risk (Zero factors) --> 3.9% (30-day risk of death, MI, or cardiac arrest)    * IMPRESSION & RECOMMENDATIONS:  Low -risk patient for a Low to Moderate -risk surgery/procedure  No further cardiac work-up is needed at the moment. There are no current cardiac contraindications to prevent from proceeding with the scheduled surgery/procedure.    - This consult serves only as a ollie-operative cardiac risk stratification and evaluation to predict 30-days cardiac complications risk and mortality. The decision to proceed with the surgery/procedure is made by the performing physician and the patient - * Patient-based characteristics (Functional capacity)  Patient is able to achieve more than 4 MET (walk 4 blocks, climb 2 flights of stairs, etc...)          Y [X] / N []    High-risk patient features:  - Recent (<30 days) or active MI          Y [] / N [X]  - Unstable or severe angina          Y [] / N [X]  - Decompensated heart failure, or worsening or new-onset heart failure          Y [] / N [X]  - Severe valvular disease          Y [] / N [X]  - Significant arrhythmia (Tachy- or Bradyarrhythmia)          Y [] / N [X]    * Surgery/Procedure-based characteristics (Type of surgery)  - Low-risk procedure (outpatient procedure, elective, endoscopy, etc...)          Y [x] / N []  - Elevated or Moderate-risk procedure (Inpatient)          Y [x] / N []  - High-risk procedure (urgent/emergent procedure, Intrathoracic, vascular, etc...)          Y [] / N []    * Revised Cardiac Risk Index (RCRI)  1- History of ischemic heart disease          Y [] / N [X]  2- History of congestive heart failure          Y [] / N [X]  3- History of stroke/TIA          Y [] / N [X]  4- History of insulin-dependent diabetes          Y [] / N [X]  5- Chronic kidney disease (Cr >2mg/dL)          Y [] / N [X]  6- Undergoing suprainguinal vascular, intraperitoneal, or intrathoracic surgery          Y [] / N [X]    Class I risk (Zero factors) --> 3.9% (30-day risk of death, MI, or cardiac arrest)    * IMPRESSION & RECOMMENDATIONS:  Low -risk patient for a Moderate -risk surgery/procedure  No further cardiac work-up is needed at the moment. There are no current cardiac contraindications to prevent from proceeding with the scheduled surgery/procedure.    - This consult serves only as a ollie-operative cardiac risk stratification and evaluation to predict 30-days cardiac complications risk and mortality. The decision to proceed with the surgery/procedure is made by the performing physician and the patient -

## 2020-09-11 NOTE — CONSULT NOTE ADULT - SUBJECTIVE AND OBJECTIVE BOX
HPI:  60 year old male with hx of COPD not on home O2 and lung cancer  s/p left upper lobectomy [place] days ago, was found to have brain mass with vasogenic edema and midline shift. Patient is planned for craniotomy.    No chest pain, no palpitations, no PND, no orthopnea, no ASIF. No cardiac history    PAST MEDICAL & SURGICAL HISTORY  Anxiety  COPD (chronic obstructive pulmonary disease)  H/O spinal fusion      FAMILY HISTORY:  FAMILY HISTORY:  No pertinent family history in first degree relatives    SOCIAL HISTORY:  [x] ex-smoker  []Alcohol  []Drug    ALLERGIES:  penicillin (Hives)    MEDICATIONS:  MEDICATIONS  (STANDING):  budesonide 160 MICROgram(s)/formoterol 4.5 MICROgram(s) Inhaler 2 Puff(s) Inhalation two times a day  dexAMETHasone  Injectable 4 milliGRAM(s) IV Push every 6 hours  heparin   Injectable 5000 Unit(s) SubCutaneous every 8 hours  levETIRAcetam 500 milliGRAM(s) Oral two times a day  pantoprazole    Tablet 40 milliGRAM(s) Oral every 24 hours    HOME MEDICATIONS:  Home Medications:  Trelegy Ellipta inhalation powder: 1 puff(s) inhaled once a day (10 Sep 2020 21:01)    VITALS:   T(F): 96.9 (09-11 @ 21:32), Max: 98.1 (09-11 @ 13:07)  HR: 80 (09-11 @ 21:32) (69 - 83)  BP: 144/84 (09-11 @ 21:32) (141/80 - 169/87)  BP(mean): --  RR: 18 (09-11 @ 21:32) (18 - 19)  SpO2: 100% (09-10 @ 15:59) (97% - 100%)    I&O's Summary    REVIEW OF SYSTEMS:  CONSTITUTIONAL: No weakness, fevers or chills  EYES: No visual changes  ENT: No vertigo or throat pain   NECK: No pain or stiffness  RESPIRATORY: No cough, wheezing, hemoptysis; No shortness of breath  CARDIOVASCULAR: No chest pain or palpitations  GASTROINTESTINAL: No abdominal or epigastric pain. No nausea, vomiting, or hematemesis; No diarrhea or constipation. No melena or hematochezia.  GENITOURINARY: No dysuria, frequency or hematuria  NEUROLOGICAL: (+) Headaches  SKIN: No itching, no rashes  MSK: No pain    PHYSICAL EXAM:  NEURO: patient is awake , alert and oriented  GEN: Not in acute distress  NECK: no thyroid enlargement, no JVD  LUNGS: Clear to auscultation bilaterally   CARDIOVASCULAR: S1/S2 present, RRR , no murmurs or rubs, no carotid bruits,  + PP bilaterally  ABD: Soft, non-tender, non-distended, +BS  EXT: No ASIF  SKIN: Intact    LABS:                        12.9   5.13  )-----------( 261      ( 11 Sep 2020 05:28 )             38.8     09-11    141  |  103  |  21<H>  ----------------------------<  153<H>  3.7   |  29  |  0.9    Ca    9.5      11 Sep 2020 05:28  Mg     2.1     09-11    TPro  6.5  /  Alb  3.9  /  TBili  <0.2  /  DBili  x   /  AST  10  /  ALT  10  /  AlkPhos  76  09-11    CARDIAC MARKERS ( 10 Sep 2020 14:41 )  x     / <0.01 ng/mL / x     / x     / x        RADIOLOGY:  -CXR: n/a    -TTE: n/a    ECG:  sinus at 72bpm

## 2020-09-11 NOTE — PROGRESS NOTE ADULT - SUBJECTIVE AND OBJECTIVE BOX
Recent events noted.  59 yo man with a history of lung cancer (poorly diff adeno) diagnosed a year ago (10/2019) s/p left upper lobectomy and node dissection, [id number], Stage IIIA ([place]).  He is s/p post op thoracic radiation for N2 disease to a dose of 50.4Gy to the left bronchial stump and mediastinum  ( [facility] 2/24/20 - 4/1/20).  Recent PET/CT at regional radiology on 8/17/20 showed no evidence of FDG avid malignancy.  He is admitted with headaches.  MRI brain showed: Heterogeneously enhancing centrally necrotic 3.1cm mass within the right temporal lobe with significant surrounding mass effect and vasogenic edema with resultant uncal herniation and mass effect upon the midbrain. Given history of lung cancer this most likely represents a metastatic lesion though a primary lesion cannot be entirely excluded.    Met vs primary brain lesion.  Given 1 year interval since initial diagnosis and no evidence of visceral mets on recent CT, would recommend craniotomy for definitive diagnosis if possible as the treatment regimen for primary CNS tumor varies significantly from the treatment regimen for metastatic disease.  Once definitive diagnosis is obtained, we can bring him in for a formal consultation so that we can have a meaningful discussion on treatment options.      As always, please call with questions [phone]

## 2020-09-11 NOTE — PROGRESS NOTE ADULT - SUBJECTIVE AND OBJECTIVE BOX
Hospital Course:    61 y/o male admitted with right temporal mass, previous history of lung CA      Vital Signs Last 24 Hrs  T(C): 36.4 (11 Sep 2020 04:49), Max: 36.4 (11 Sep 2020 04:49)  T(F): 97.6 (11 Sep 2020 04:49), Max: 97.6 (11 Sep 2020 04:49)  HR: 74 (11 Sep 2020 04:49) (69 - 83)  BP: 164/92 (11 Sep 2020 04:49) (141/80 - 169/87)  BP(mean): --  RR: 18 (11 Sep 2020 04:49) (18 - 19)  SpO2: 100% (10 Sep 2020 15:59) (97% - 100%)    I&O's Detail    I&O's Summary      PHYSICAL EXAM:  Neurological:  awake alert, mild H/A, good strength of all extrmities, verbalizes well    Motor exam:         [] Upper extremity              Bi(c5)  WE(c6)  EE(c7)   FF(c8)                                                R         5/5        5/5        5/5       5/5                                               L          5/5        5/5        5/5       5/5         [] Lower extremeity          HF(l2)   KE(l3)    TA(l4)   EHL(l5)  GS(s1)                                                 R        5/5        5/5        5/5       5/5         5/5                                               L         5/5        5/5       5/5       5/5          5/5                                                 LABS:                        12.9   5.13  )-----------( 261      ( 11 Sep 2020 05:28 )             38.8     09-11    141  |  103  |  21<H>  ----------------------------<  153<H>  3.7   |  29  |  0.9    Ca    9.5      11 Sep 2020 05:28  Mg     2.1     09-11    TPro  6.5  /  Alb  3.9  /  TBili  <0.2  /  DBili  x   /  AST  10  /  ALT  10  /  AlkPhos  76  09-11        CARDIAC MARKERS ( 10 Sep 2020 14:41 )  x     / <0.01 ng/mL / x     / x     / x          CAPILLARY BLOOD GLUCOSE          Drug Levels: [] N/A    CSF Analysis: [] N/A      Allergies    penicillin (Hives)    Intolerances      MEDICATIONS:  Antibiotics:    Neuro:  levETIRAcetam  IVPB 500 milliGRAM(s) IV Intermittent every 12 hours    Anticoagulation:  heparin   Injectable 5000 Unit(s) SubCutaneous every 8 hours    OTHER:  budesonide 160 MICROgram(s)/formoterol 4.5 MICROgram(s) Inhaler 2 Puff(s) Inhalation two times a day  dexAMETHasone  Injectable 4 milliGRAM(s) IV Push every 6 hours  pantoprazole    Tablet 40 milliGRAM(s) Oral every 24 hours    IVF:    CULTURES:    RADIOLOGY & ADDITIONAL TESTS: < from: MR Head w/wo IV Cont (09.10.20 @ 23:38) >  IMPRESSION:    Heterogeneously enhancing centrally necrotic mass within the right temporal lobe with significant surrounding mass effect and vasogenic edema with resultant uncal herniation and mass effect upon the midbrain. Given history of lung cancer this most likely represents a metastatic lesion though a primary lesion cannot be entirely excluded.          < end of copied text >        ASSESSMENT:  60y Male with past history of lung CA, found to have a mass in the right temporal lobe    BRAIN TUMOR, HYPOKALEMIA  ^BRAIN TUMOR, HYPOKALEMIA  Handoff  MEWS Score  Anxiety  COPD (chronic obstructive pulmonary disease)  Brain tumor  H/O spinal fusion  HEADACHE/DIZZINESS/NO TASTE  90+  Hypokalemia  Hypomagnesemia  [email or web address]      PLAN: metastatic work up, RT and Onc consults will follow

## 2020-09-11 NOTE — DISCHARGE NOTE PROVIDER - HOSPITAL COURSE
60 year old male with hx of COPD not on home O2 and lung cancer diagnosed in September s/p chemo/rad presents from [facility] with new onset headaches starting 3 days ago.  Headache is right sided, mild, and non-radiating.  At [facility] he had a CT head done showing an ill-defined superior temporal mass with surrounding vasogenic edema with 0.9 cm midline shift to the left.  He denies any changes in vision, denies any weakness. Patient planned for craniectomy next Thursday. He is medically stable and ready to be discharged. 60 year old male with hx of COPD not on home O2 and lung cancer diagnosed in September s/p chemo/rad presents from [facility] with new onset headaches starting 3 days ago.  Headache is right sided, mild, and non-radiating.  At [facility] he had a CT head done showing an ill-defined superior temporal mass with surrounding vasogenic edema with 0.9 cm midline shift to the left.  He denies any changes in vision, denies any weakness. Patient planned for craniectomy next Thursday. Cardiology has wriiten a clearance. He is medically stable and ready to be discharged.   60 year old male with hx of COPD not on home O2 and lung cancer diagnosed in September s/p chemo/rad presents from [facility] with new onset headaches starting 3 days ago.  Headache is right sided, mild, and non-radiating.  At [facility] he had a CT head done showing an ill-defined superior temporal mass with surrounding vasogenic edema with 0.9 cm midline shift to the left.  He denies any changes in vision, denies any weakness. Patient planned for craniectomy next Thursday. Cardiology has written a clearance. He is medically stable and ready to be discharged.

## 2020-09-11 NOTE — DISCHARGE NOTE PROVIDER - CARE PROVIDER_API CALL
Jacinto Rhodes  NEUROSURGERY  [address] [place] [address], [address]  [place] [address]  Phone: [phone]  Fax: [phone]  Follow Up Time: 1-3 days

## 2020-09-11 NOTE — DISCHARGE NOTE PROVIDER - NSDCCPCAREPLAN_GEN_ALL_CORE_FT
PRINCIPAL DISCHARGE DIAGNOSIS  Diagnosis: Primary malignant neoplasm of lung with metastasis to brain  Assessment and Plan of Treatment: Your presenting symptoms are due to metastasisi of your lung cancer to your brain. You have a scheduled surgery next thursday 9/17/2020 [facility] neurosurgery to remove this metastatic tumor. 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 You have a scheduled surgery next thursday 9/17/2020 [facility] neurosurgery to remove this metastatic tumor. Cardiology has given clearance for the procedure.  Continue stroids along with protonix daily . Seizure medication has been added as well. 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 You have a scheduled surgery next thursday 9/17/2020 [facility] neurosurgery to remove this metastatic tumor. Cardiology has given clearance for the procedure.  Continue stroids along with protonix daily . Seizure medication has been added as well. Please follow up with neurosurgery in 1-3 days. Absolutely no driving for now.

## 2020-09-11 NOTE — DISCHARGE NOTE PROVIDER - NSDCMRMEDTOKEN_GEN_ALL_CORE_FT
Trelegy Ellipta inhalation powder: 1 puff(s) inhaled once a day dexamethasone 4 mg oral tablet: 1 tab(s) orally every 6 hours   levETIRAcetam 500 mg oral tablet: 1 tab(s) orally 2 times a day  pantoprazole 40 mg oral delayed release tablet: 1 tab(s) orally every 24 hours  Trelegy Ellipta inhalation powder: 1 puff(s) inhaled once a day

## 2020-09-12 ENCOUNTER — TRANSCRIPTION ENCOUNTER (OUTPATIENT)
Age: 60
End: 2020-09-12

## 2020-09-12 VITALS
TEMPERATURE: 97 F | RESPIRATION RATE: 19 BRPM | SYSTOLIC BLOOD PRESSURE: 137 MMHG | HEART RATE: 68 BPM | DIASTOLIC BLOOD PRESSURE: 89 MMHG

## 2020-09-12 LAB
ANION GAP SERPL CALC-SCNC: 7 MMOL/L — SIGNIFICANT CHANGE UP (ref 7–14)
BUN SERPL-MCNC: 24 MG/DL — HIGH (ref 10–20)
CALCIUM SERPL-MCNC: 8.9 MG/DL — SIGNIFICANT CHANGE UP (ref 8.5–10.1)
CHLORIDE SERPL-SCNC: 107 MMOL/L — SIGNIFICANT CHANGE UP (ref 98–110)
CO2 SERPL-SCNC: 30 MMOL/L — SIGNIFICANT CHANGE UP (ref 17–32)
CREAT SERPL-MCNC: 0.9 MG/DL — SIGNIFICANT CHANGE UP (ref 0.7–1.5)
GLUCOSE SERPL-MCNC: 155 MG/DL — HIGH (ref 70–99)
HCT VFR BLD CALC: 37 % — LOW (ref 42–52)
HGB BLD-MCNC: 12.1 G/DL — LOW (ref 14–18)
MCHC RBC-ENTMCNC: 28.3 PG — SIGNIFICANT CHANGE UP (ref 27–31)
MCHC RBC-ENTMCNC: 32.7 G/DL — SIGNIFICANT CHANGE UP (ref 32–37)
MCV RBC AUTO: 86.4 FL — SIGNIFICANT CHANGE UP (ref 80–94)
NRBC # BLD: 0 /100 WBCS — SIGNIFICANT CHANGE UP (ref 0–0)
PLATELET # BLD AUTO: 294 K/UL — SIGNIFICANT CHANGE UP (ref 130–400)
POTASSIUM SERPL-MCNC: 3.7 MMOL/L — SIGNIFICANT CHANGE UP (ref 3.5–5)
POTASSIUM SERPL-SCNC: 3.7 MMOL/L — SIGNIFICANT CHANGE UP (ref 3.5–5)
RBC # BLD: 4.28 M/UL — LOW (ref 4.7–6.1)
RBC # FLD: 14.2 % — SIGNIFICANT CHANGE UP (ref 11.5–14.5)
SARS-COV-2 IGG SERPL QL IA: NEGATIVE — SIGNIFICANT CHANGE UP
SARS-COV-2 IGM SERPL IA-ACNC: 0.02 INDEX — SIGNIFICANT CHANGE UP
SODIUM SERPL-SCNC: 144 MMOL/L — SIGNIFICANT CHANGE UP (ref 135–146)
WBC # BLD: 10.21 K/UL — SIGNIFICANT CHANGE UP (ref 4.8–10.8)
WBC # FLD AUTO: 10.21 K/UL — SIGNIFICANT CHANGE UP (ref 4.8–10.8)

## 2020-09-12 PROCEDURE — 99239 HOSP IP/OBS DSCHRG MGMT >30: CPT

## 2020-09-12 PROCEDURE — 93306 TTE W/DOPPLER COMPLETE: CPT | Mod: 26

## 2020-09-12 PROCEDURE — 99222 1ST HOSP IP/OBS MODERATE 55: CPT

## 2020-09-12 RX ORDER — DEXAMETHASONE 0.5 MG/5ML
1 ELIXIR ORAL
Qty: 21 | Refills: 0
Start: 2020-09-12 | End: 2020-09-18

## 2020-09-12 RX ORDER — PANTOPRAZOLE SODIUM 20 MG/1
1 TABLET, DELAYED RELEASE ORAL
Qty: 30 | Refills: 0
Start: 2020-09-12 | End: 2020-10-11

## 2020-09-12 RX ORDER — LEVETIRACETAM 250 MG/1
1 TABLET, FILM COATED ORAL
Qty: 60 | Refills: 0
Start: 2020-09-12 | End: 2020-10-11

## 2020-09-12 NOTE — PROGRESS NOTE ADULT - SUBJECTIVE AND OBJECTIVE BOX
Patient is a 60y old  Male who presents with a chief complaint of headaches (11 Sep 2020 21:52)    INTERVAL HPI/OVERNIGHT EVENTS: no complaints, feels better, wants to go home ASAP  ROS: Denies CP, SOB, AP, new weakness  All other systems reviewed and are within normal limits.  InitialHPI:  60 year old male with hx of COPD not on home O2 and lung cancer diagnosed in September s/p chemo/rad presents from [facility] with new onset headaches starting 3 days ago.  Headache is right sided, mild, and non-radiating.  At [facility] he had a CT head done showing an ill-defined superior temporal mass with surrounding vasogenic edema with 0.9 cm midline shift to the left.  He denies any changes in vision, denies any weakness.    At [facility] he received a 1L LR bolus, Keppra 1g, solumedrol 125 mg, and had mag/K repleted.    Triage vitals: /100  HR 83  RR 19  Temp 97.4  SpO2 97% on room air (10 Sep 2020 21:02)    PAST MEDICAL & SURGICAL HISTORY:  Anxiety    COPD (chronic obstructive pulmonary disease)    H/O spinal fusion  General: NAD, AAO3  CV: S1 S2  Resp: decreased breath sounds at bases  GI: NT/ND/S +BS  MS: no clubbing/cyanosis/edema, 2+ pulses b/l  Neuro: nonfocal, 2+reflexes thruout    MEDICATIONS  (STANDING):  budesonide 160 MICROgram(s)/formoterol 4.5 MICROgram(s) Inhaler 2 Puff(s) Inhalation two times a day  dexAMETHasone  Injectable 4 milliGRAM(s) IV Push every 6 hours  heparin   Injectable 5000 Unit(s) SubCutaneous every 8 hours  levETIRAcetam 500 milliGRAM(s) Oral two times a day  pantoprazole    Tablet 40 milliGRAM(s) Oral every 24 hours    MEDICATIONS  (PRN):    Home Medications:  Trelegy Ellipta inhalation powder: 1 puff(s) inhaled once a day (10 Sep 2020 21:01)    Vital Signs Last 24 Hrs  T(C): 36.1 (11 Sep 2020 21:32), Max: 36.7 (11 Sep 2020 13:07)  T(F): 96.9 (11 Sep 2020 21:32), Max: 98.1 (11 Sep 2020 13:07)  HR: 80 (11 Sep 2020 21:32) (70 - 80)  BP: 144/84 (11 Sep 2020 21:32) (144/84 - 152/87)  BP(mean): --  RR: 18 (11 Sep 2020 21:32) (18 - 18)  SpO2: --  CAPILLARY BLOOD GLUCOSE        LABS:                        12.1   10.21 )-----------( 294      ( 12 Sep 2020 05:52 )             37.0     09-12    144  |  107  |  24<H>  ----------------------------<  155<H>  3.7   |  30  |  0.9    Ca    8.9      12 Sep 2020 05:52  Mg     2.1     09-11    TPro  6.5  /  Alb  3.9  /  TBili  <0.2  /  DBili  x   /  AST  10  /  ALT  10  /  AlkPhos  76  09-11    LIVER FUNCTIONS - ( 11 Sep 2020 05:28 )  Alb: 3.9 g/dL / Pro: 6.5 g/dL / ALK PHOS: 76 U/L / ALT: 10 U/L / AST: 10 U/L / GGT: x           CARDIAC MARKERS ( 10 Sep 2020 14:41 )  x     / <0.01 ng/mL / x     / x     / x                      Chart, Consultant(s) Notes Reviewed:  [x ] YES  [ ] NO  Care Discussed with Consultants/Other Providers/ Housestaff [ x] YES  [ ] NO  Radiology, labs, old available records personally reviewed.

## 2020-09-12 NOTE — PROGRESS NOTE ADULT - ASSESSMENT
60 year old male with hx of COPD not on home O2 and lung cancer diagnosed in September 2019 s/p lobectomy/chemo/rad presents from [facility] with new onset headaches starting 3 days ago.  Headache is right sided, mild, and non-radiating.  At [facility] he had a CT head done showing an ill-defined superior temporal mass with surrounding vasogenic edema with 0.9 cm midline shift to the left.  He denies any changes in vision, denies any weakness. Reports nl PET scan 2 wks ago.    # Brain mets w/ midline shift and brain compression   - keppra 500 bid and decadron 4mg q6h   -d/w pt's oupt rad-onc Dr. Coto who recommends resection of the lesion followed by outpt radiation  -d/w NS: plan for OR Thursday, NS wants pt to go home and will set up elective surgery on Thursday (pt agrees w/ plan)  -cardio clearance done  -d/w NS GRISELDA Kapoor: Dr. Rhodes wants to speak to pt this am before d/c    # COPD  - takes trelegy at home, starting symbicort here    Dispo - may d/c home once NS clears the pt  Counselled pt about no driving    Discharge instructions discussed and patient knows when to seek immediate medical attention.  Patient has proper follow up.  All results discussed and patient aware they require further follow up/work up.  Stressed importance of proper follow up.  Medications prescribed and changes discussed.  All questions and concerns from patient  addressed. Understanding of instructions verbalized.    Time spent in completing discharge process and coordinating care 45 minutes.    Discussed with housestaff, nursing, case mgmt, neurosurgery

## 2020-09-12 NOTE — DISCHARGE NOTE NURSING/CASE MANAGEMENT/SOCIAL WORK - PATIENT PORTAL LINK FT
You can access the FollowMyHealth Patient Portal offered by [facility] by registering at the following website: [email or web address]. By joining [employer]’s FollowMyHealth portal, you will also be able to view your health information using other applications (apps) compatible with our system.

## 2020-09-16 DIAGNOSIS — F41.9 ANXIETY DISORDER, UNSPECIFIED: ICD-10-CM

## 2020-09-16 DIAGNOSIS — E87.6 HYPOKALEMIA: ICD-10-CM

## 2020-09-16 DIAGNOSIS — Z88.0 ALLERGY STATUS TO PENICILLIN: ICD-10-CM

## 2020-09-16 DIAGNOSIS — J44.9 CHRONIC OBSTRUCTIVE PULMONARY DISEASE, UNSPECIFIED: ICD-10-CM

## 2020-09-16 DIAGNOSIS — Z98.1 ARTHRODESIS STATUS: ICD-10-CM

## 2020-09-16 DIAGNOSIS — E83.42 HYPOMAGNESEMIA: ICD-10-CM

## 2020-09-16 DIAGNOSIS — C79.31 SECONDARY MALIGNANT NEOPLASM OF BRAIN: ICD-10-CM

## 2020-09-16 DIAGNOSIS — Z87.891 PERSONAL HISTORY OF NICOTINE DEPENDENCE: ICD-10-CM

## 2020-09-16 DIAGNOSIS — Z85.118 PERSONAL HISTORY OF OTHER MALIGNANT NEOPLASM OF BRONCHUS AND LUNG: ICD-10-CM

## 2020-09-16 DIAGNOSIS — G93.6 CEREBRAL EDEMA: ICD-10-CM

## 2020-09-18 ENCOUNTER — LABORATORY RESULT (OUTPATIENT)
Age: 60
End: 2020-09-18

## 2020-09-18 ENCOUNTER — OUTPATIENT (OUTPATIENT)
Dept: OUTPATIENT SERVICES | Facility: HOSPITAL | Age: 60
LOS: 1 days | Discharge: HOME | End: 2020-09-18

## 2020-09-18 DIAGNOSIS — Z11.59 ENCOUNTER FOR SCREENING FOR OTHER VIRAL DISEASES: ICD-10-CM

## 2020-09-18 DIAGNOSIS — Z98.1 ARTHRODESIS STATUS: Chronic | ICD-10-CM

## 2020-09-21 ENCOUNTER — RESULT REVIEW (OUTPATIENT)
Age: 60
End: 2020-09-21

## 2020-09-21 ENCOUNTER — INPATIENT (INPATIENT)
Facility: HOSPITAL | Age: 60
LOS: 2 days | Discharge: HOME | End: 2020-09-24
Attending: NEUROLOGICAL SURGERY | Admitting: NEUROLOGICAL SURGERY
Payer: MEDICARE

## 2020-09-21 VITALS
WEIGHT: 184.97 LBS | TEMPERATURE: 98 F | HEIGHT: 70 IN | RESPIRATION RATE: 18 BRPM | OXYGEN SATURATION: 98 % | SYSTOLIC BLOOD PRESSURE: 136 MMHG | DIASTOLIC BLOOD PRESSURE: 77 MMHG | HEART RATE: 67 BPM

## 2020-09-21 DIAGNOSIS — Z98.890 OTHER SPECIFIED POSTPROCEDURAL STATES: Chronic | ICD-10-CM

## 2020-09-21 DIAGNOSIS — Z98.1 ARTHRODESIS STATUS: Chronic | ICD-10-CM

## 2020-09-21 LAB
ABO RH CONFIRMATION: SIGNIFICANT CHANGE UP
BLD GP AB SCN SERPL QL: SIGNIFICANT CHANGE UP
GLUCOSE BLDC GLUCOMTR-MCNC: 114 MG/DL — HIGH (ref 70–99)
HCT VFR BLD CALC: 38.8 % — LOW (ref 42–52)
HGB BLD-MCNC: 13 G/DL — LOW (ref 14–18)
MCHC RBC-ENTMCNC: 28.8 PG — SIGNIFICANT CHANGE UP (ref 27–31)
MCHC RBC-ENTMCNC: 33.5 G/DL — SIGNIFICANT CHANGE UP (ref 32–37)
MCV RBC AUTO: 85.8 FL — SIGNIFICANT CHANGE UP (ref 80–94)
PLATELET # BLD AUTO: 194 K/UL — SIGNIFICANT CHANGE UP (ref 130–400)
RBC # BLD: 4.52 M/UL — LOW (ref 4.7–6.1)
RBC # FLD: 15.2 % — HIGH (ref 11.5–14.5)
WBC # BLD: 13.7 K/UL — HIGH (ref 4.8–10.8)
WBC # FLD AUTO: 13.7 K/UL — HIGH (ref 4.8–10.8)

## 2020-09-21 PROCEDURE — 93010 ELECTROCARDIOGRAM REPORT: CPT

## 2020-09-21 PROCEDURE — 88307 TISSUE EXAM BY PATHOLOGIST: CPT | Mod: 26

## 2020-09-21 PROCEDURE — 88341 IMHCHEM/IMCYTCHM EA ADD ANTB: CPT | Mod: 26,59

## 2020-09-21 PROCEDURE — 99291 CRITICAL CARE FIRST HOUR: CPT

## 2020-09-21 PROCEDURE — 61518 REMOVAL OF BRAIN LESION: CPT | Mod: AS

## 2020-09-21 PROCEDURE — 88344 IMHCHEM/IMCYTCHM EA MLT ANTB: CPT | Mod: 26

## 2020-09-21 PROCEDURE — 61519 REMOVE BRAIN LINING LESION: CPT | Mod: AS,59

## 2020-09-21 PROCEDURE — 88342 IMHCHEM/IMCYTCHM 1ST ANTB: CPT | Mod: 26,59

## 2020-09-21 RX ORDER — FLUTICASONE FUROATE, UMECLIDINIUM BROMIDE AND VILANTEROL TRIFENATATE 200; 62.5; 25 UG/1; UG/1; UG/1
1 POWDER RESPIRATORY (INHALATION)
Qty: 0 | Refills: 0 | DISCHARGE

## 2020-09-21 RX ORDER — ONDANSETRON 8 MG/1
4 TABLET, FILM COATED ORAL ONCE
Refills: 0 | Status: DISCONTINUED | OUTPATIENT
Start: 2020-09-21 | End: 2020-09-22

## 2020-09-21 RX ORDER — MORPHINE SULFATE 50 MG/1
2 CAPSULE, EXTENDED RELEASE ORAL EVERY 4 HOURS
Refills: 0 | Status: DISCONTINUED | OUTPATIENT
Start: 2020-09-21 | End: 2020-09-22

## 2020-09-21 RX ORDER — BUDESONIDE AND FORMOTEROL FUMARATE DIHYDRATE 160; 4.5 UG/1; UG/1
2 AEROSOL RESPIRATORY (INHALATION)
Refills: 0 | Status: DISCONTINUED | OUTPATIENT
Start: 2020-09-21 | End: 2020-09-24

## 2020-09-21 RX ORDER — SODIUM CHLORIDE 9 MG/ML
1000 INJECTION INTRAMUSCULAR; INTRAVENOUS; SUBCUTANEOUS
Refills: 0 | Status: DISCONTINUED | OUTPATIENT
Start: 2020-09-21 | End: 2020-09-21

## 2020-09-21 RX ORDER — LEVETIRACETAM 250 MG/1
500 TABLET, FILM COATED ORAL EVERY 12 HOURS
Refills: 0 | Status: DISCONTINUED | OUTPATIENT
Start: 2020-09-21 | End: 2020-09-24

## 2020-09-21 RX ORDER — OXYCODONE AND ACETAMINOPHEN 5; 325 MG/1; MG/1
2 TABLET ORAL EVERY 6 HOURS
Refills: 0 | Status: DISCONTINUED | OUTPATIENT
Start: 2020-09-21 | End: 2020-09-22

## 2020-09-21 RX ORDER — OXYCODONE AND ACETAMINOPHEN 5; 325 MG/1; MG/1
1 TABLET ORAL EVERY 4 HOURS
Refills: 0 | Status: DISCONTINUED | OUTPATIENT
Start: 2020-09-21 | End: 2020-09-22

## 2020-09-21 RX ORDER — DEXAMETHASONE 0.5 MG/5ML
6 ELIXIR ORAL EVERY 6 HOURS
Refills: 0 | Status: DISCONTINUED | OUTPATIENT
Start: 2020-09-21 | End: 2020-09-23

## 2020-09-21 RX ORDER — HYDROMORPHONE HYDROCHLORIDE 2 MG/ML
0.5 INJECTION INTRAMUSCULAR; INTRAVENOUS; SUBCUTANEOUS
Refills: 0 | Status: DISCONTINUED | OUTPATIENT
Start: 2020-09-21 | End: 2020-09-22

## 2020-09-21 RX ORDER — PANTOPRAZOLE SODIUM 20 MG/1
40 TABLET, DELAYED RELEASE ORAL
Refills: 0 | Status: DISCONTINUED | OUTPATIENT
Start: 2020-09-21 | End: 2020-09-24

## 2020-09-21 RX ORDER — SODIUM CHLORIDE 9 MG/ML
1000 INJECTION, SOLUTION INTRAVENOUS
Refills: 0 | Status: DISCONTINUED | OUTPATIENT
Start: 2020-09-21 | End: 2020-09-21

## 2020-09-21 RX ADMIN — Medication 1.25 MILLIGRAM(S): at 22:22

## 2020-09-21 RX ADMIN — Medication 4 MILLIGRAM(S): at 23:55

## 2020-09-21 RX ADMIN — HYDROMORPHONE HYDROCHLORIDE 0.5 MILLIGRAM(S): 2 INJECTION INTRAMUSCULAR; INTRAVENOUS; SUBCUTANEOUS at 22:28

## 2020-09-21 RX ADMIN — HYDROMORPHONE HYDROCHLORIDE 0.5 MILLIGRAM(S): 2 INJECTION INTRAMUSCULAR; INTRAVENOUS; SUBCUTANEOUS at 22:01

## 2020-09-21 RX ADMIN — MORPHINE SULFATE 2 MILLIGRAM(S): 50 CAPSULE, EXTENDED RELEASE ORAL at 23:45

## 2020-09-21 RX ADMIN — SODIUM CHLORIDE 100 MILLILITER(S): 9 INJECTION, SOLUTION INTRAVENOUS at 21:52

## 2020-09-21 RX ADMIN — HYDROMORPHONE HYDROCHLORIDE 0.5 MILLIGRAM(S): 2 INJECTION INTRAMUSCULAR; INTRAVENOUS; SUBCUTANEOUS at 22:30

## 2020-09-21 RX ADMIN — HYDROMORPHONE HYDROCHLORIDE 0.5 MILLIGRAM(S): 2 INJECTION INTRAMUSCULAR; INTRAVENOUS; SUBCUTANEOUS at 23:00

## 2020-09-21 NOTE — CHART NOTE - NSCHARTNOTEFT_GEN_A_CORE
PACU ANESTHESIA ADMISSION NOTE      Procedure: Craniotomy, using frameless stereotaxy, for neoplasm excision      Post op diagnosis:  Metastatic cancer to brain        ____  Intubated  TV:______       Rate: ______      FiO2: ______    ___x_  Patent Airway    ___x_  Full return of protective reflexes    __x__  Full recovery from anesthesia / back to baseline status    Vitals:  T(C): 36.4   HR: 79  BP: 143/84  RR: 18   SpO2: 98%    Mental Status:  _x___ Awake   _____ Alert   _____ Drowsy   _____ Sedated    Nausea/Vomiting:  __x__ NO  ______Yes,   See Post - Op Orders          Pain Scale (0-10):  _____    Treatment: ___x_ None    ____ See Post - Op/PCA Orders    Post - Operative Fluids:   ____ Oral   __x__ See Post - Op Orders    Plan: Discharge:   ____Home       _____Floor     ___x__Critical Care    _____  Other:_________________    Comments: Report given to ICU PA

## 2020-09-21 NOTE — CONSULT NOTE ADULT - SUBJECTIVE AND OBJECTIVE BOX
CHERI WELCH  [id number]  60y Male POD 0 s/p craniotomy for resection       60 year old male with hx of COPD not on home O2 and lung cancer diagnosed in September s/p chemo/rad presents from [facility] with new onset headaches starting 3 days ago.  Headache is right sided, mild, and non-radiating.  At [facility] he had a CT head done showing an ill-defined superior temporal mass with surrounding vasogenic edema with 0.9 cm midline shift to the left.  He denies any changes in vision, denies any weakness.    Pt was admitted and discharged with plan to return for OR with Dr Rhodes for Right sided Crani for resection of brain lesion.          Indication for ICU admission:  Admit Date:  ICU Date:  OR Date:    penicillin (Hives)    PAST MEDICAL & SURGICAL HISTORY:  Malignant neoplasm of upper lobe of left lung    Anxiety    COPD (chronic obstructive pulmonary disease)    Status post lung surgery  Left upper lobe removed    H/O spinal fusion      Home Medications:  Trelegy Ellipta inhalation powder: 1 puff(s) inhaled once a day (21 Sep 2020 14:24)        24HRS EVENT:              DVT PTX:     GI PTX:    ***Tubes/Lines/Drains  ***  Peripheral IV  Central Venous Line     	Date   Arterial Line		                Date   [] PICC:         	[] Midline		[] Mediport             Urinary Catheter		Indication: Strict I&O    Date Placed:       REVIEW OF SYSTEMS    [ ] A ten-point review of systems was otherwise negative except as noted.  [ ] Due to altered mental status/intubation, subjective information were not able to be obtained from the patient. History was obtained, to the extent possible, from review of the chart and collateral sources of information.                   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       60 year old male with hx of COPD not on home O2 and lung cancer diagnosed in September s/p chemo/rad presents from [facility] with new onset headaches starting 3 days ago.  Headache is right sided, mild, and non-radiating.  At [facility] he had a CT head done showing an ill-defined superior temporal mass with surrounding vasogenic edema with 0.9 cm midline shift to the left.  He denies any changes in vision, denies any weakness.    Pt was admitted and discharged with plan to return for OR with Dr Rhodes for Right sided Crani for resection of brain lesion.    Subjective and Objective:  Recent events noted. 61 yo man with a history of lung cancer (poorly diff  adeno) diagnosed a year ago (10/2019) s/p left upper lobectomy and node  dissection, dV2V4M5, Stage IIIA ([place]). He is s/p post op thoracic  radiation for N2 disease to a dose of 50.4Gy to the left bronchial stump and  mediastinum ( [facility] 2/24/20 - 4/1/20). Recent PET/CT at regional  radiology on 8/17/20 showed no evidence of FDG avid malignancy. He is admitted  with headaches. MRI brain showed: Heterogeneously enhancing centrally necrotic  3.1cm mass within the right temporal lobe with significant surrounding mass  effect and vasogenic edema with resultant uncal herniation and mass effect upon  the midbrain. Given history of lung cancer this most likely represents a  metastatic lesion though a primary lesion cannot be entirely excluded.    Met vs primary brain lesion. Given 1 year interval since initial diagnosis and  no evidence of visceral mets on recent CT, would recommend craniotomy for  definitive diagnosis if possible as the treatment regimen for primary CNS tumor  varies significantly from the treatment regimen for metastatic disease. Once  definitive diagnosis is obtained, we can bring him in for a formal consultation  so that we can have a meaningful discussion on treatment options.    As always, please call with questions [phone]  At [facility] he had a  CT head done showing an ill-defined superior temporal mass with surrounding  vasogenic edema with 0.9 cm midline shift to the left            Indication for ICU admission:  Admit Date:  ICU Date:  OR Date:    penicillin (Hives)    PAST MEDICAL & SURGICAL HISTORY:  Malignant neoplasm of upper lobe of left lung    Anxiety    COPD (chronic obstructive pulmonary disease)    Status post lung surgery  Left upper lobe removed    H/O spinal fusion      Home Medications:  Trelegy Ellipta inhalation powder: 1 puff(s) inhaled once a day (21 Sep 2020 14:24)        24HRS EVENT:              DVT PTX:     GI PTX:    ***Tubes/Lines/Drains  ***  Peripheral IV  Central Venous Line     	Date   Arterial Line		                Date   [] PICC:         	[] Midline		[] Mediport             Urinary Catheter		Indication: Strict I&O    Date Placed:       REVIEW OF SYSTEMS    [ ] A ten-point review of systems was otherwise negative except as noted.  [ ] Due to altered mental status/intubation, subjective information were not able to be obtained from the patient. History was obtained, to the extent possible, from review of the chart and collateral sources of information.                   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    Pt has a hx of smoking ( quit January 2019), COPD no home 02, recently diagnosed ( 10/2019) lung cancer ( poorly diff adeno) s/p left upper lobectomy and node dissection (maimo), He is also s/p post op thoracic radiation  to the left bronchial stump and mediastinum ( [facility] 2/24/20 - 4/1/20). Recent PET/CT at  on 8/17/20 showed no evidence malignancy.    He was admitted to [facility] early sept w/cc of headaches w/ MRI brain showing evidence of centrally necrotic mass within the right temporal lobe , resultant uncal herniation/mass effect on the midbrain. Patient showed no evidence of neurological deficits and denied changes in vison, weakness. He was discharged  with plans to RTOR on 9/21 for craniotomy and resection of lesion     Intraop course complicated by slight decrease in MAPs to 80s for which he received two doses of ephedrine. Case otherwise uncomplicated, patient extubated and transferred to PACU in stable but guarded condition.     Upgraded to SICU for close neurological monitoring.     OR time: 2hr 40 min   EBL: 50   UO: 850   IVF 1.7L     Indication for ICU admission: s/p resection of right temporal bone tumor upgraded for close neuro checks  Admit Date: 9/21  ICU Date: 9/21  OR Date: 9/21    penicillin (Hives)    PAST MEDICAL & SURGICAL HISTORY:  Malignant neoplasm of upper lobe of left lung    Anxiety    COPD (chronic obstructive pulmonary disease)    Status post lung surgery  Left upper lobe removed    H/O spinal fusion      Home Medications:  Trelegy Ellipta inhalation powder: 1 puff(s) inhaled once a day (21 Sep 2020 14:24)    24HRS EVENT:  Daily Height in cm: 177.8 (21 Sep 2020 15:32)    Daily         CURRENT MEDS:  Neurologic Medications  HYDROmorphone  Injectable 0.5 milliGRAM(s) IV Push every 10 minutes PRN Moderate Pain (4 - 6)  ondansetron Injectable 4 milliGRAM(s) IV Push once PRN Nausea and/or Vomiting  Gastrointestinal Medications  lactated ringers. 1000 milliLiter(s) IV Continuous <Continuous>  Endocrine/Metabolic Medications  dexAMETHasone  Injectable 6 milliGRAM(s) IV Push every 6 hours  Topical/Othr Medications      ICU Vital Signs Last 24 Hrs  T(C): 36.8 (21 Sep 2020 21:51), Max: 36.8 (21 Sep 2020 21:51)  T(F): 98.2 (21 Sep 2020 21:51), Max: 98.2 (21 Sep 2020 21:51)  HR: 69 (21 Sep 2020 22:06) (67 - 78)  BP: 155/85 (21 Sep 2020 22:06) (136/77 - 155/85)  BP(mean): 115 (21 Sep 2020 22:06) (108 - 115)  RR: 20 (21 Sep 2020 22:06) (18 - 20)  SpO2: 100% (21 Sep 2020 22:06) (98% - 100%)      I&O's Summary    I&O's Detail      PHYSICAL EXAM:    General/Neuro  Opens eyes spontaneously pupils 3+ reactive, AOx3, no focal neuro deficits, follows commands B/L upper and lower ext, strength 4+ B/L UE 5+ B/L LE. Craniotomy site slightly saturated , area soft     Lungs:    clear to auscultation, Normal expansion/effort.     Cardiovascular : S1, S2.  Regular rate and rhythm.    Cardiac Rhythm: Normal Sinus Rhythm    GI: Abdomen soft, Non-tender, Non-distended.      Extremities: Extremities warm, pink, well-perfused.     Derm: Good skin turgor, no skin breakdown.      :  Sanchez catheter in place.      CXR:     LABS:  CAPILLARY BLOOD GLUCOSE      POCT Blood Glucose.: 114 mg/dL (21 Sep 2020 21:55)                                        DVT PTX:     GI PTX:    ***Tubes/Lines/Drains  ***  Peripheral IV  Central Venous Line     	Date   Arterial Line		                Date   [] PICC:         	[] Midline		[] Mediport             Urinary Catheter		Indication: Strict I&O    Date Placed:       REVIEW OF SYSTEMS    [ ] A ten-point review of systems was otherwise negative except as noted.  [ ] Due to altered mental status/intubation, subjective information were not able to be obtained from the patient. History was obtained, to the extent possible, from review of the chart and collateral sources of information.                   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    Pt has a hx of smoking ( quit January 2019), COPD no home 02, recently diagnosed ( 10/2019) lung cancer ( poorly diff adeno) s/p left upper lobectomy and node dissection (maimo), He is also s/p post op thoracic radiation  to the left bronchial stump and mediastinum ( [facility] 2/24/20 - 4/1/20). Recent PET/CT at  on 8/17/20 showed no evidence malignancy.    He was admitted to [facility] early sept w/cc of headaches w/ MRI brain showing evidence of centrally necrotic mass within the right temporal lobe , resultant uncal herniation/mass effect on the midbrain. Patient showed no evidence of neurological deficits and denied changes in vison, weakness. He was discharged  with plans to RTOR on 9/21 for craniotomy and resection of lesion     Intraop course complicated by slight decrease in MAPs to 80s for which he received two doses of ephedrine. Case otherwise uncomplicated, patient extubated and transferred to PACU in stable but guarded condition.     Upgraded to SICU for close neurological monitoring.     OR time: 2hr 40 min   EBL: 50   UO: 850   IVF 1.7L     Indication for ICU admission: s/p resection of right temporal bone tumor upgraded for close neuro checks  Admit Date: 9/21  ICU Date: 9/21  OR Date: 9/21    penicillin (Hives)    PAST MEDICAL & SURGICAL HISTORY:  Malignant neoplasm of upper lobe of left lung    Anxiety    COPD (chronic obstructive pulmonary disease)    Status post lung surgery  Left upper lobe removed    H/O spinal fusion      Home Medications:  Trelegy Ellipta inhalation powder: 1 puff(s) inhaled once a day (21 Sep 2020 14:24)    24HRS EVENT:  Daily Height in cm: 177.8 (21 Sep 2020 15:32)    Daily         CURRENT MEDS:  Neurologic Medications  HYDROmorphone  Injectable 0.5 milliGRAM(s) IV Push every 10 minutes PRN Moderate Pain (4 - 6)  ondansetron Injectable 4 milliGRAM(s) IV Push once PRN Nausea and/or Vomiting  Gastrointestinal Medications  lactated ringers. 1000 milliLiter(s) IV Continuous <Continuous>  Endocrine/Metabolic Medications  dexAMETHasone  Injectable 6 milliGRAM(s) IV Push every 6 hours  Topical/Othr Medications      ICU Vital Signs Last 24 Hrs  T(C): 36.8 (21 Sep 2020 21:51), Max: 36.8 (21 Sep 2020 21:51)  T(F): 98.2 (21 Sep 2020 21:51), Max: 98.2 (21 Sep 2020 21:51)  HR: 69 (21 Sep 2020 22:06) (67 - 78)  BP: 155/85 (21 Sep 2020 22:06) (136/77 - 155/85)  BP(mean): 115 (21 Sep 2020 22:06) (108 - 115)  RR: 20 (21 Sep 2020 22:06) (18 - 20)  SpO2: 100% (21 Sep 2020 22:06) (98% - 100%)      I&O's Summary    I&O's Detail      PHYSICAL EXAM:    General/Neuro  Opens eyes spontaneously pupils 3+ reactive, AOx3, no focal neuro deficits, follows commands B/L upper and lower ext, strength 4+ B/L UE 5+ B/L LE. Craniotomy site slightly saturated , area soft     Lungs:    clear to auscultation, Normal expansion/effort.     Cardiovascular : S1, S2.  Regular rate and rhythm.    Cardiac Rhythm: Normal Sinus Rhythm    GI: Abdomen soft, Non-tender, Non-distended.      Extremities: Extremities warm, pink, well-perfused.     Derm: Good skin turgor, no skin breakdown.      :  Sanchez catheter in place.      CXR:     LABS:  CAPILLARY BLOOD GLUCOSE      POCT Blood Glucose.: 114 mg/dL (21 Sep 2020 21:55)       DVT PTX: SCD, holding pharmacological prophylaxis     GI PTX: PPI    ***Tubes/Lines/Drains  ***  Peripheral IV  Sanchez   L radial benigno        Urinary Catheter		Indication: Strict I&O    Date Placed:       REVIEW OF SYSTEMS    [x ] A ten-point review of systems was otherwise negative except as noted.  [ ] Due to altered mental status/intubation, subjective information were not able to be obtained from the patient. History was obtained, to the extent possible, from review of the chart and collateral sources of information.                   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    Pt has a hx of smoking ( quit January 2019), COPD no home 02, recently diagnosed ( 10/2019) lung cancer ( poorly diff adeno) s/p left upper lobectomy and node dissection (maimo), He is also s/p post op thoracic radiation  to the left bronchial stump and mediastinum ( [facility] 2/24/20 - 4/1/20). Recent PET/CT at  on 8/17/20 showed no evidence malignancy.    He was admitted to [facility] early sept w/cc of headaches w/ MRI brain showing evidence of centrally necrotic mass within the right temporal lobe , resultant uncal herniation/mass effect on the midbrain. Patient showed no evidence of neurological deficits and denied changes in vison, weakness. He was discharged  with plans to RTOR on 9/21 for craniotomy and resection of lesion     Intraop course complicated by slight decrease in MAPs to 80s for which he received two doses of ephedrine (5mg). Case otherwise uncomplicated, patient extubated and transferred to PACU in stable but guarded condition.     Upgraded to SICU for close neurological monitoring.     OR time: 2hr 40 min   EBL: 50   UO: 850   IVF 1.7L     Indication for ICU admission: s/p resection of right temporal bone tumor upgraded for close neuro checks  Admit Date: 9/21  ICU Date: 9/21  OR Date: 9/21    penicillin (Hives)    PAST MEDICAL & SURGICAL HISTORY:  Malignant neoplasm of upper lobe of left lung    Anxiety    COPD (chronic obstructive pulmonary disease)    Status post lung surgery  Left upper lobe removed    H/O spinal fusion      Home Medications:  Trelegy Ellipta inhalation powder: 1 puff(s) inhaled once a day (21 Sep 2020 14:24)    24HRS EVENT:  Daily Height in cm: 177.8 (21 Sep 2020 15:32)    Daily         CURRENT MEDS:  Neurologic Medications  HYDROmorphone  Injectable 0.5 milliGRAM(s) IV Push every 10 minutes PRN Moderate Pain (4 - 6)  ondansetron Injectable 4 milliGRAM(s) IV Push once PRN Nausea and/or Vomiting  Gastrointestinal Medications  lactated ringers. 1000 milliLiter(s) IV Continuous <Continuous>  Endocrine/Metabolic Medications  dexAMETHasone  Injectable 6 milliGRAM(s) IV Push every 6 hours  Topical/Othr Medications      ICU Vital Signs Last 24 Hrs  T(C): 36.8 (21 Sep 2020 21:51), Max: 36.8 (21 Sep 2020 21:51)  T(F): 98.2 (21 Sep 2020 21:51), Max: 98.2 (21 Sep 2020 21:51)  HR: 69 (21 Sep 2020 22:06) (67 - 78)  BP: 155/85 (21 Sep 2020 22:06) (136/77 - 155/85)  BP(mean): 115 (21 Sep 2020 22:06) (108 - 115)  RR: 20 (21 Sep 2020 22:06) (18 - 20)  SpO2: 100% (21 Sep 2020 22:06) (98% - 100%)        PHYSICAL EXAM:    General/Neuro  Opens eyes spontaneously pupils 3+ reactive, AOx3, no focal neuro deficits, follows commands B/L upper and lower ext, strength 4+ B/L UE 5+ B/L LE. Craniotomy site slightly saturated , area soft     Lungs:    clear to auscultation, Normal expansion/effort.     Cardiovascular : S1, S2.  Regular rate and rhythm.    Cardiac Rhythm: Normal Sinus Rhythm    GI: Abdomen soft, Non-tender, Non-distended.      Extremities: Extremities warm, pink, well-perfused.     Derm: Good skin turgor, no skin breakdown.      :  Sanchez catheter in place.      CXR:     LABS:  CAPILLARY BLOOD GLUCOSE      POCT Blood Glucose.: 114 mg/dL (21 Sep 2020 21:55)       DVT PTX: SCD, holding pharmacological prophylaxis     GI PTX: PPI    ***Tubes/Lines/Drains  ***  Peripheral IV  Sanchez   L radial benigno        Urinary Catheter		Indication: Strict I&O    Date Placed:       REVIEW OF SYSTEMS    [x ] A ten-point review of systems was otherwise negative except as noted.  [ ] Due to altered mental status/intubation, subjective information were not able to be obtained from the patient. History was obtained, to the extent possible, from review of the chart and collateral sources of information.

## 2020-09-21 NOTE — CONSULT NOTE ADULT - ASSESSMENT
ASSESSMENT/PLAN: 60yMale POD 0 s/p right craniotomy for resection of brain mass upgraded to SICU for q1 neuro checks      Neurologic:  Brain mass: S/p right craniotomy   - Likely mets, f/u path , frozen not sent  - Q1 neuro checks  - AM CTH  -     Respiratory:    Cardiovascular:    Gastrointestinal/Nutrition:    Genitourinary/Renal:    Hematologic:    Infectious Disease:    Endocrine:    Disposition: ASSESSMENT/PLAN: 60yMale POD 0 s/p right craniotomy for resection of brain mass upgraded to SICU for q1 neuro checks      Neurologic:  Brain mass: S/p right craniotomy   - Likely mets, f/u path , frozen not sent  - Q1 neuro checks  - AM CTH  - Decadron  - Keppra  - HOB 45  - NSGY following, recs appreciated     Pain Control:   - Tylenol/oxy prn     Respiratory:  COPD: no home o2  - On home Trelegy Ellipta, restart  - Maintain 02 sat 88-92  - AM CXR  - IS/PT /OOBTC when cleared by NDGY    Cardiovascular:  HTN, post op:   - No known cardiac hx  - Vasotec 1.25 for systolic 190, decreased to 160 will monitor  - EKG NSR  - 9/12/2020 Echo: EF 69%, g1dd, trace mitral valve regurg  - Cleared by dr Sanchez as low risk preop     Gastrointestinal/Nutrition:  - No acute issues  - NPO for am CTH    Prophylaxis:  - PPI  - Senna    Genitourinary/Renal:  - No acute issues  - Sanchez in place, strict I/Os  - F/u lytes and replete  - IVF while NPO    Hematologic:  - No acute issues  - F/u labs    Prophylaxis:  - Holding pharmacological prophylaxis per NSGY  - SCD    Infectious Disease:  - No acute issues  - Periop Clinda ( pcn allergy)  - F/u labs, monitor for S/S of infection     Endocrine:  - No acute issues  - ISS, FSq4 while NPO/on decadron     Disposition: SICU

## 2020-09-21 NOTE — H&P ADULT - HISTORY OF PRESENT ILLNESS
60 year old male with hx of COPD not on home O2 and lung cancer diagnosed in September s/p chemo/rad presents from [facility] with new onset headaches starting 3 days ago.  Headache is right sided, mild, and non-radiating.  At [facility] he had a CT head done showing an ill-defined superior temporal mass with surrounding vasogenic edema with 0.9 cm midline shift to the left.  He denies any changes in vision, denies any weakness.    Pt was admitted and discharged with plan to return for OR with Dr Rhodes for Right sided Crani for resection of brain lesion.

## 2020-09-21 NOTE — ASU PATIENT PROFILE, ADULT - PMH
Anxiety    COPD (chronic obstructive pulmonary disease)    Malignant neoplasm of upper lobe of left lung

## 2020-09-21 NOTE — CONSULT NOTE ADULT - ATTENDING COMMENTS
59 y/o male with Metastatic Brain Tumor.  S/P right craniotomy.  Lung cancer.  COPD.  Hypertensive emergency.  Lactic acidosis.  Acute postoperative pain.    PLAN:  - Neuro: checks q1h; Keppra; follow CT head in am  - Respiratory: use O2 with NC as needed.  - Cardio: keep normotensive; Hypertensive emergency give Vasotec iv.  - GI: npo  - Renal: follow serum electrolytes and UOP.  - GI and DVT prophylaxis with SCDs only

## 2020-09-21 NOTE — H&P ADULT - NSICDXPASTMEDICALHX_GEN_ALL_CORE_FT
PAST MEDICAL HISTORY:  Anxiety     COPD (chronic obstructive pulmonary disease)     Malignant neoplasm of upper lobe of left lung

## 2020-09-21 NOTE — H&P ADULT - NSHPLABSRESULTS_GEN_ALL_CORE
< from: MR Head w/wo IV Cont (09.10.20 @ 23:38) >      IMPRESSION:    Heterogeneously enhancing centrally necrotic mass within the right temporal lobe with significant surrounding mass effect and vasogenic edema with resultant uncal herniation and mass effect upon the midbrain. Given history of lung cancer this most likely represents a metastatic lesion though a primary lesion cannot be entirely excluded.                DANIKA ARMENDARIZ M.D., ATTENDING RADIOLOGIST  This document has been electronically signed. Sep 11 2020  8:44AM        < end of copied text >

## 2020-09-21 NOTE — H&P ADULT - ASSESSMENT
60 year old male with hx of COPD not on home O2 and lung cancer diagnosed in September s/p chemo/rad.  Pt was admitted and discharged with plan to return for OR with Dr Rhodes for Right sided Crani for resection of brain lesion.      Going to the OR with Dr Rhodes today 9/21/20, discussed with attending

## 2020-09-22 DIAGNOSIS — Z02.9 ENCOUNTER FOR ADMINISTRATIVE EXAMINATIONS, UNSPECIFIED: ICD-10-CM

## 2020-09-22 LAB
A1C WITH ESTIMATED AVERAGE GLUCOSE RESULT: 5.4 % — SIGNIFICANT CHANGE UP (ref 4–5.6)
ALBUMIN SERPL ELPH-MCNC: 3.7 G/DL — SIGNIFICANT CHANGE UP (ref 3.5–5.2)
ALP SERPL-CCNC: 56 U/L — SIGNIFICANT CHANGE UP (ref 30–115)
ALT FLD-CCNC: 32 U/L — SIGNIFICANT CHANGE UP (ref 0–41)
ANION GAP SERPL CALC-SCNC: 12 MMOL/L — SIGNIFICANT CHANGE UP (ref 7–14)
APTT BLD: 21 SEC — CRITICAL LOW (ref 27–39.2)
AST SERPL-CCNC: 12 U/L — SIGNIFICANT CHANGE UP (ref 0–41)
BASOPHILS # BLD AUTO: 0.01 K/UL — SIGNIFICANT CHANGE UP (ref 0–0.2)
BASOPHILS NFR BLD AUTO: 0.1 % — SIGNIFICANT CHANGE UP (ref 0–1)
BILIRUB SERPL-MCNC: 0.5 MG/DL — SIGNIFICANT CHANGE UP (ref 0.2–1.2)
BUN SERPL-MCNC: 22 MG/DL — HIGH (ref 10–20)
CALCIUM SERPL-MCNC: 8.7 MG/DL — SIGNIFICANT CHANGE UP (ref 8.5–10.1)
CHLORIDE SERPL-SCNC: 98 MMOL/L — SIGNIFICANT CHANGE UP (ref 98–110)
CK MB CFR SERPL CALC: 11.3 NG/ML — HIGH (ref 0.6–6.3)
CK MB CFR SERPL CALC: 7.7 NG/ML — HIGH (ref 0.6–6.3)
CK SERPL-CCNC: 252 U/L — HIGH (ref 0–225)
CK SERPL-CCNC: 271 U/L — HIGH (ref 0–225)
CO2 SERPL-SCNC: 26 MMOL/L — SIGNIFICANT CHANGE UP (ref 17–32)
CREAT SERPL-MCNC: 0.7 MG/DL — SIGNIFICANT CHANGE UP (ref 0.7–1.5)
EOSINOPHIL # BLD AUTO: 0 K/UL — SIGNIFICANT CHANGE UP (ref 0–0.7)
EOSINOPHIL NFR BLD AUTO: 0 % — SIGNIFICANT CHANGE UP (ref 0–8)
ESTIMATED AVERAGE GLUCOSE: 108 MG/DL — SIGNIFICANT CHANGE UP (ref 68–114)
GLUCOSE BLDC GLUCOMTR-MCNC: 112 MG/DL — HIGH (ref 70–99)
GLUCOSE BLDC GLUCOMTR-MCNC: 112 MG/DL — HIGH (ref 70–99)
GLUCOSE BLDC GLUCOMTR-MCNC: 114 MG/DL — HIGH (ref 70–99)
GLUCOSE BLDC GLUCOMTR-MCNC: 114 MG/DL — HIGH (ref 70–99)
GLUCOSE BLDC GLUCOMTR-MCNC: 122 MG/DL — HIGH (ref 70–99)
GLUCOSE SERPL-MCNC: 116 MG/DL — HIGH (ref 70–99)
HCT VFR BLD CALC: 37.8 % — LOW (ref 42–52)
HGB BLD-MCNC: 12.8 G/DL — LOW (ref 14–18)
IMM GRANULOCYTES NFR BLD AUTO: 1.2 % — HIGH (ref 0.1–0.3)
INR BLD: 1.23 RATIO — SIGNIFICANT CHANGE UP (ref 0.65–1.3)
LACTATE SERPL-SCNC: 0.9 MMOL/L — SIGNIFICANT CHANGE UP (ref 0.7–2)
LACTATE SERPL-SCNC: 2.6 MMOL/L — HIGH (ref 0.7–2)
LACTATE SERPL-SCNC: 3.1 MMOL/L — HIGH (ref 0.7–2)
LYMPHOCYTES # BLD AUTO: 0.39 K/UL — LOW (ref 1.2–3.4)
LYMPHOCYTES # BLD AUTO: 2.8 % — LOW (ref 20.5–51.1)
MAGNESIUM SERPL-MCNC: 1.9 MG/DL — SIGNIFICANT CHANGE UP (ref 1.8–2.4)
MCHC RBC-ENTMCNC: 29 PG — SIGNIFICANT CHANGE UP (ref 27–31)
MCHC RBC-ENTMCNC: 33.9 G/DL — SIGNIFICANT CHANGE UP (ref 32–37)
MCV RBC AUTO: 85.7 FL — SIGNIFICANT CHANGE UP (ref 80–94)
MONOCYTES # BLD AUTO: 0.37 K/UL — SIGNIFICANT CHANGE UP (ref 0.1–0.6)
MONOCYTES NFR BLD AUTO: 2.7 % — SIGNIFICANT CHANGE UP (ref 1.7–9.3)
NEUTROPHILS # BLD AUTO: 12.77 K/UL — HIGH (ref 1.4–6.5)
NEUTROPHILS NFR BLD AUTO: 93.2 % — HIGH (ref 42.2–75.2)
NRBC # BLD: 0 /100 WBCS — SIGNIFICANT CHANGE UP (ref 0–0)
NRBC # BLD: 0 /100 WBCS — SIGNIFICANT CHANGE UP (ref 0–0)
PHOSPHATE SERPL-MCNC: 4.4 MG/DL — SIGNIFICANT CHANGE UP (ref 2.1–4.9)
PLATELET # BLD AUTO: 185 K/UL — SIGNIFICANT CHANGE UP (ref 130–400)
POTASSIUM SERPL-MCNC: 4.5 MMOL/L — SIGNIFICANT CHANGE UP (ref 3.5–5)
POTASSIUM SERPL-SCNC: 4.5 MMOL/L — SIGNIFICANT CHANGE UP (ref 3.5–5)
PROT SERPL-MCNC: 5.5 G/DL — LOW (ref 6–8)
PROTHROM AB SERPL-ACNC: 14.1 SEC — HIGH (ref 9.95–12.87)
RBC # BLD: 4.41 M/UL — LOW (ref 4.7–6.1)
RBC # FLD: 15.1 % — HIGH (ref 11.5–14.5)
SODIUM SERPL-SCNC: 136 MMOL/L — SIGNIFICANT CHANGE UP (ref 135–146)
TROPONIN T SERPL-MCNC: <0.01 NG/ML — SIGNIFICANT CHANGE UP
WBC # BLD: 14.36 K/UL — HIGH (ref 4.8–10.8)
WBC # FLD AUTO: 14.36 K/UL — HIGH (ref 4.8–10.8)

## 2020-09-22 PROCEDURE — 71045 X-RAY EXAM CHEST 1 VIEW: CPT | Mod: 26

## 2020-09-22 PROCEDURE — 70450 CT HEAD/BRAIN W/O DYE: CPT | Mod: 26

## 2020-09-22 PROCEDURE — 99291 CRITICAL CARE FIRST HOUR: CPT

## 2020-09-22 RX ORDER — INFLUENZA VIRUS VACCINE 15; 15; 15; 15 UG/.5ML; UG/.5ML; UG/.5ML; UG/.5ML
0.5 SUSPENSION INTRAMUSCULAR ONCE
Refills: 0 | Status: COMPLETED | OUTPATIENT
Start: 2020-09-22 | End: 2020-09-22

## 2020-09-22 RX ORDER — MAGNESIUM SULFATE 500 MG/ML
1 VIAL (ML) INJECTION ONCE
Refills: 0 | Status: COMPLETED | OUTPATIENT
Start: 2020-09-22 | End: 2020-09-22

## 2020-09-22 RX ORDER — SODIUM CHLORIDE 9 MG/ML
1000 INJECTION, SOLUTION INTRAVENOUS
Refills: 0 | Status: DISCONTINUED | OUTPATIENT
Start: 2020-09-22 | End: 2020-09-22

## 2020-09-22 RX ORDER — OXYCODONE HYDROCHLORIDE 5 MG/1
10 TABLET ORAL EVERY 6 HOURS
Refills: 0 | Status: DISCONTINUED | OUTPATIENT
Start: 2020-09-22 | End: 2020-09-24

## 2020-09-22 RX ORDER — SENNA PLUS 8.6 MG/1
1 TABLET ORAL AT BEDTIME
Refills: 0 | Status: DISCONTINUED | OUTPATIENT
Start: 2020-09-22 | End: 2020-09-24

## 2020-09-22 RX ORDER — DEXTROSE 50 % IN WATER 50 %
25 SYRINGE (ML) INTRAVENOUS ONCE
Refills: 0 | Status: DISCONTINUED | OUTPATIENT
Start: 2020-09-22 | End: 2020-09-24

## 2020-09-22 RX ORDER — ACETAMINOPHEN 500 MG
650 TABLET ORAL EVERY 6 HOURS
Refills: 0 | Status: DISCONTINUED | OUTPATIENT
Start: 2020-09-22 | End: 2020-09-24

## 2020-09-22 RX ORDER — AMLODIPINE BESYLATE 2.5 MG/1
5 TABLET ORAL DAILY
Refills: 0 | Status: DISCONTINUED | OUTPATIENT
Start: 2020-09-22 | End: 2020-09-24

## 2020-09-22 RX ORDER — DEXTROSE 50 % IN WATER 50 %
15 SYRINGE (ML) INTRAVENOUS ONCE
Refills: 0 | Status: DISCONTINUED | OUTPATIENT
Start: 2020-09-22 | End: 2020-09-22

## 2020-09-22 RX ORDER — HEPARIN SODIUM 5000 [USP'U]/ML
5000 INJECTION INTRAVENOUS; SUBCUTANEOUS EVERY 8 HOURS
Refills: 0 | Status: DISCONTINUED | OUTPATIENT
Start: 2020-09-23 | End: 2020-09-24

## 2020-09-22 RX ORDER — INSULIN HUMAN 100 [IU]/ML
INJECTION, SOLUTION SUBCUTANEOUS EVERY 4 HOURS
Refills: 0 | Status: DISCONTINUED | OUTPATIENT
Start: 2020-09-22 | End: 2020-09-22

## 2020-09-22 RX ORDER — OXYCODONE HYDROCHLORIDE 5 MG/1
5 TABLET ORAL EVERY 6 HOURS
Refills: 0 | Status: DISCONTINUED | OUTPATIENT
Start: 2020-09-22 | End: 2020-09-24

## 2020-09-22 RX ORDER — DEXTROSE 50 % IN WATER 50 %
12.5 SYRINGE (ML) INTRAVENOUS ONCE
Refills: 0 | Status: DISCONTINUED | OUTPATIENT
Start: 2020-09-22 | End: 2020-09-22

## 2020-09-22 RX ORDER — INSULIN LISPRO 100/ML
VIAL (ML) SUBCUTANEOUS AT BEDTIME
Refills: 0 | Status: DISCONTINUED | OUTPATIENT
Start: 2020-09-22 | End: 2020-09-22

## 2020-09-22 RX ORDER — INSULIN LISPRO 100/ML
VIAL (ML) SUBCUTANEOUS
Refills: 0 | Status: DISCONTINUED | OUTPATIENT
Start: 2020-09-22 | End: 2020-09-24

## 2020-09-22 RX ORDER — INSULIN LISPRO 100/ML
VIAL (ML) SUBCUTANEOUS
Refills: 0 | Status: DISCONTINUED | OUTPATIENT
Start: 2020-09-22 | End: 2020-09-22

## 2020-09-22 RX ORDER — GLUCAGON INJECTION, SOLUTION 0.5 MG/.1ML
1 INJECTION, SOLUTION SUBCUTANEOUS ONCE
Refills: 0 | Status: DISCONTINUED | OUTPATIENT
Start: 2020-09-22 | End: 2020-09-22

## 2020-09-22 RX ORDER — SODIUM CHLORIDE 9 MG/ML
1000 INJECTION INTRAMUSCULAR; INTRAVENOUS; SUBCUTANEOUS
Refills: 0 | Status: DISCONTINUED | OUTPATIENT
Start: 2020-09-22 | End: 2020-09-22

## 2020-09-22 RX ADMIN — Medication 6 MILLIGRAM(S): at 17:15

## 2020-09-22 RX ADMIN — OXYCODONE HYDROCHLORIDE 10 MILLIGRAM(S): 5 TABLET ORAL at 21:20

## 2020-09-22 RX ADMIN — OXYCODONE HYDROCHLORIDE 5 MILLIGRAM(S): 5 TABLET ORAL at 06:28

## 2020-09-22 RX ADMIN — Medication 6 MILLIGRAM(S): at 05:52

## 2020-09-22 RX ADMIN — OXYCODONE HYDROCHLORIDE 10 MILLIGRAM(S): 5 TABLET ORAL at 01:25

## 2020-09-22 RX ADMIN — OXYCODONE HYDROCHLORIDE 10 MILLIGRAM(S): 5 TABLET ORAL at 01:55

## 2020-09-22 RX ADMIN — Medication 1.25 MILLIGRAM(S): at 02:31

## 2020-09-22 RX ADMIN — OXYCODONE HYDROCHLORIDE 10 MILLIGRAM(S): 5 TABLET ORAL at 20:27

## 2020-09-22 RX ADMIN — Medication 6 MILLIGRAM(S): at 11:45

## 2020-09-22 RX ADMIN — PANTOPRAZOLE SODIUM 40 MILLIGRAM(S): 20 TABLET, DELAYED RELEASE ORAL at 06:21

## 2020-09-22 RX ADMIN — OXYCODONE HYDROCHLORIDE 10 MILLIGRAM(S): 5 TABLET ORAL at 11:20

## 2020-09-22 RX ADMIN — LEVETIRACETAM 420 MILLIGRAM(S): 250 TABLET, FILM COATED ORAL at 05:54

## 2020-09-22 RX ADMIN — MORPHINE SULFATE 2 MILLIGRAM(S): 50 CAPSULE, EXTENDED RELEASE ORAL at 00:00

## 2020-09-22 RX ADMIN — Medication 100 MILLIGRAM(S): at 02:31

## 2020-09-22 RX ADMIN — SODIUM CHLORIDE 120 MILLILITER(S): 9 INJECTION INTRAMUSCULAR; INTRAVENOUS; SUBCUTANEOUS at 01:00

## 2020-09-22 RX ADMIN — Medication 100 MILLIGRAM(S): at 13:49

## 2020-09-22 RX ADMIN — SENNA PLUS 1 TABLET(S): 8.6 TABLET ORAL at 22:05

## 2020-09-22 RX ADMIN — OXYCODONE HYDROCHLORIDE 5 MILLIGRAM(S): 5 TABLET ORAL at 05:53

## 2020-09-22 RX ADMIN — OXYCODONE HYDROCHLORIDE 10 MILLIGRAM(S): 5 TABLET ORAL at 10:49

## 2020-09-22 RX ADMIN — LEVETIRACETAM 420 MILLIGRAM(S): 250 TABLET, FILM COATED ORAL at 17:14

## 2020-09-22 RX ADMIN — Medication 6 MILLIGRAM(S): at 23:10

## 2020-09-22 RX ADMIN — Medication 100 MILLIGRAM(S): at 22:34

## 2020-09-22 RX ADMIN — AMLODIPINE BESYLATE 5 MILLIGRAM(S): 2.5 TABLET ORAL at 10:33

## 2020-09-22 RX ADMIN — Medication 100 GRAM(S): at 02:31

## 2020-09-22 RX ADMIN — BUDESONIDE AND FORMOTEROL FUMARATE DIHYDRATE 2 PUFF(S): 160; 4.5 AEROSOL RESPIRATORY (INHALATION) at 22:04

## 2020-09-22 NOTE — PHYSICAL THERAPY INITIAL EVALUATION ADULT - GENERAL OBSERVATIONS, REHAB EVAL
9:45-10:15. chart reviewed. Pt received semi-bar at B/S, alert, oriented, able to follow multi-step instructions and agreeable to PT evaluation.  + IV, + A line, + monitoring, on RA SPO2 100%, stable vitals. c/o headache 3-4/10 head dressing s/p craniotomy. no neurological deficits was detected, intact sensation, denies numbness or tingling.

## 2020-09-22 NOTE — PHYSICAL THERAPY INITIAL EVALUATION ADULT - PERTINENT HX OF CURRENT PROBLEM, REHAB EVAL
61 y/o male s/p R crani for resection of brain tumor. Pt has a hx of smoking ( quit January 2019), COPD no home 02, recently diagnosed ( 10/2019) lung cancer ( poorly diff adeno) s/p left upper lobectomy and node dissection (melo), He is also s/p post op thoracic radiation  to the left bronchial stump and mediastinum ( [facility] 2/24/20 - 4/1/20). Recent PET/CT at  on 8/17/20 showed no evidence malignancy.

## 2020-09-22 NOTE — CONSULT NOTE ADULT - ASSESSMENT
IMPRESSION: Rehab of right craniotomy for brain tumor resection    PRECAUTIONS: [  ] Cardiac  [  ] Respiratory  [  ] Seizures [  ] Contact Isolation  [  ] Droplet Isolation  [  ] Other    Weight Bearing Status:     RECOMMENDATION:    Out of Bed to Chair     DVT/Decubiti Prophylaxis    REHAB PLAN:     [x   ] Bedside P/T 3-5 times a week   [   ]   Bedside O/T  2-3 times a week             [   ] No Rehab Therapy Indicated                   [   ]  Speech Therapy   Conditioning/ROM                                    ADL  Bed Mobility                                               Conditioning/ROM  Transfers                                                     Bed Mobility  Sitting /Standing Balance                         Transfers                                        Gait Training                                               Sitting/Standing Balance  Stair Training [   ]Applicable                    Home equipment Eval                                                                        Splinting  [   ] Only      GOALS:   ADL   [   ]   Independent                    Transfers  [ x  ] Independent                          Ambulation  [ x  ] Independent     [ x   ] With device                            [   ]  CG                                                         [   ]  CG                                                                  [   ] CG                            [    ] Min A                                                   [   ] Min A                                                              [   ] Min  A          DISCHARGE PLAN:   [   ]  Good candidate for Intensive Rehabilitation/Hospital based                                             Will tolerate 3hrs Intensive Rehab Daily                                       [    ]  Short Term Rehab in Skilled Nursing Facility                                       [ x   ]  Home with Outpatient or  services                                         [    ]  Possible Candidate for Intensive Hospital based Rehab

## 2020-09-22 NOTE — PROGRESS NOTE ADULT - ASSESSMENT
Neurologic:  Brain mass: S/p right craniotomy   - Likely mets, f/u path , frozen not sent  - Q1 neuro checks  - AM CTH  - Decadron  - Keppra  - HOB 45  - NSGY following, recs appreciated     Pain Control:   - Tylenol/oxy prn     Respiratory:  COPD: no home o2  - On home Trelegy Ellipta, restart  - Maintain 02 sat 88-92  - AM CXR  - IS/PT /OOBTC when cleared by NDGY    Cardiovascular:  HTN, post op:   - No known cardiac hx  - Vasotec 1.25 for systolic 190, decreased to 160 will monitor  - EKG NSR  - 9/12/2020 Echo: EF 69%, g1dd, trace mitral valve regurg  - Cleared by dr Sanchez as low risk preop   - CE ( ordered by NSGY) neg x1, f/u am     Gastrointestinal/Nutrition:  - No acute issues  - NPO for am CTH    Prophylaxis:  - PPI  - Senna    Genitourinary/Renal:  - No acute issues  - Sanchez in place, strict I/Os  - F/u lytes and replete  - IVF while NPO    Hematologic:  - No acute issues  - F/u labs    Prophylaxis:  - Holding pharmacological prophylaxis per NSGY  - SCD    Infectious Disease:  - No acute issues  - Periop Clinda ( pcn allergy)  - F/u labs, monitor for S/S of infection     Endocrine:  - No acute issues  - ISS, FSq4 while NPO/on decadron     Disposition: SICU  
59 y/o male s/p R crani for resection of brain tumor POD #0  - pain control  - clear liquid diet, advance as tolerated  - antiemetic/antipyretic prn  - monitor vitals  - PT/Rehab

## 2020-09-22 NOTE — CHART NOTE - NSCHARTNOTEFT_GEN_A_CORE
SICU Transfer Note    Transfer from: SICU  Transfer to: 3E  Accepting physician: Dr. Rhodes, neurosurgery      SICU COURSE:  60y Male POD 0 s/p craniotomy for resection of right temporal bone tumor.    Pt has a hx of smoking ( quit January 2019), COPD no home 02, recently diagnosed ( 10/2019) lung cancer ( poorly diff adeno) s/p left upper lobectomy and node dissection (maimo), He is also s/p post op thoracic radiation  to the left bronchial stump and mediastinum ( [facility] 2/24/20 - 4/1/20). Recent PET/CT at  on 8/17/20 showed no evidence malignancy.    He was admitted to [facility] early sept w/cc of headaches w/ MRI brain showing evidence of centrally necrotic mass within the right temporal lobe , resultant uncal herniation/mass effect on the midbrain. Patient showed no evidence of neurological deficits and denied changes in vison, weakness. He was discharged  with plans to RTOR on 9/21 for craniotomy and resection of lesion     Intraop course complicated by slight decrease in MAPs to 80s for which he received two doses of ephedrine (5mg). Case otherwise uncomplicated, patient extubated and transferred to PACU in stable but guarded condition.     Postop, patient was stable.  POD1 overnight was hypertensive with systolic BP in the 160s-170s, and so was started on amlodipine 5mg.  BPs now well-controlled. Currently pain is well-controlled. Ambulating and tolerating regular diet.     ASSESSMENT & PLAN:   Neurologic:  Brain mass: S/p right craniotomy   - Likely mets, f/u path , frozen not sent  - Q4 neuro checks  - 9/22 AM CT head with postop changes and decreased midline shift (3mm compared to 10mm preop)  - Decadron  - Keppra  - HOB 45  - NSGY following, recs appreciated     Pain Control:   - Tylenol/oxy prn     Respiratory:  COPD: no home o2  - On home Trelegy Ellipta, restart  - Maintain 02 sat 88-92, currently on RA  - AM CXR stable  - IS/PT /OOBTC    Cardiovascular:  HTN, post op:   - No known cardiac hx  - Vasotec 1.25 for systolic 190, decreased to 160 will monitor  - started on Amlodopine 5mg daily, BPs currently well-controlled.  - EKG NSR  - 9/12/2020 Echo: EF 69%, g1dd, trace mitral valve regurg  - Cleared by dr Sanchez as low risk preop   - CE ( ordered by NSGY) neg x2, f/u 1100    Gastrointestinal/Nutrition:  - No acute issues  - regular diet    Prophylaxis:  - PPI  - Senna    Genitourinary/Renal:  - No acute issues  - d/c asher, f/u TOV  - F/u lytes and replete  - IV lock  - lactate cleared 0.9, no longer trending    Hematologic:  - No acute issues  - F/u labs    Prophylaxis:  - Holding pharmacological prophylaxis per NSGY, restart HSQ tomorrow  - SCDs, ambulation encouraged    Infectious Disease:  - No acute issues  - Periop Clinda (pcn allergy)  - F/u labs, monitor for S/S of infection     Endocrine:  - No acute issues  - ISS, FS ACqHS      Vital Signs Last 24 Hrs  T(C): 36.7 (22 Sep 2020 08:18), Max: 37 (22 Sep 2020 01:25)  T(F): 98 (22 Sep 2020 08:18), Max: 98.6 (22 Sep 2020 01:25)  HR: 65 (22 Sep 2020 10:30) (51 - 84)  BP: 143/84 (22 Sep 2020 10:30) (131/67 - 165/115)  BP(mean): 127 (22 Sep 2020 09:00) (94 - 127)  RR: 24 (22 Sep 2020 10:30) (15 - 24)  SpO2: 100% (22 Sep 2020 10:30) (98% - 100%)  I&O's Summary    21 Sep 2020 07:01  -  22 Sep 2020 07:00  --------------------------------------------------------  IN: 1090 mL / OUT: 1005 mL / NET: 85 mL    22 Sep 2020 07:01  -  22 Sep 2020 12:39  --------------------------------------------------------  IN: 360 mL / OUT: 440 mL / NET: -80 mL        MEDICATIONS  (STANDING):  amLODIPine   Tablet 5 milliGRAM(s) Oral daily  budesonide  80 MICROgram(s)/formoterol 4.5 MICROgram(s) Inhaler 2 Puff(s) Inhalation two times a day  clindamycin IVPB 600 milliGRAM(s) IV Intermittent every 8 hours  dexAMETHasone  Injectable 6 milliGRAM(s) IV Push every 6 hours  insulin regular  human corrective regimen sliding scale   IV Push every 4 hours  levETIRAcetam  IVPB 500 milliGRAM(s) IV Intermittent every 12 hours  pantoprazole    Tablet 40 milliGRAM(s) Oral before breakfast  senna 1 Tablet(s) Oral at bedtime    MEDICATIONS  (PRN):  acetaminophen   Tablet .. 650 milliGRAM(s) Oral every 6 hours PRN Mild Pain (1 - 3)  glucagon  Injectable 1 milliGRAM(s) IntraMuscular once PRN Glucose LESS THAN 70 milligrams/deciliter  oxyCODONE    IR 5 milliGRAM(s) Oral every 6 hours PRN Moderate Pain (4 - 6)  oxyCODONE    IR 10 milliGRAM(s) Oral every 6 hours PRN Severe Pain (7 - 10)        LABS                                            12.8                  Neurophils% (auto):   x      (09-22 @ 05:40):    14.36)-----------(185          Lymphocytes% (auto):  x                                             37.8                   Eosinphils% (auto):   x        Manual%: Neutrophils x    ; Lymphocytes x    ; Eosinophils x    ; Bands%: x    ; Blasts x                                    x      |  x      |  x                   Calcium: x     / iCa: x      (09-22 @ 05:40)    ----------------------------<  x         Magnesium: x                                x       |  x      |  x                Phosphorous: 4.4      TPro  5.5    /  Alb  3.7    /  TBili  0.5    /  DBili  x      /  AST  12     /  ALT  32     /  AlkPhos  56     21 Sep 2020 23:37    ( 09-21 @ 23:37 )   PT: 14.10 sec;   INR: 1.23 ratio  aPTT: 21.0 sec    Discussed with 9/22 @1251. SICU Transfer Note    Transfer from: SICU  Transfer to: 3E  Accepting physician: Dr. Rhodes, neurosurgery      SICU COURSE:  60y Male POD 0 s/p craniotomy for resection of right temporal bone tumor.    Pt has a hx of smoking ( quit January 2019), COPD no home 02, recently diagnosed ( 10/2019) lung cancer ( poorly diff adeno) s/p left upper lobectomy and node dissection (maimo), He is also s/p post op thoracic radiation  to the left bronchial stump and mediastinum ( [facility] 2/24/20 - 4/1/20). Recent PET/CT at  on 8/17/20 showed no evidence malignancy.    He was admitted to [facility] early sept w/cc of headaches w/ MRI brain showing evidence of centrally necrotic mass within the right temporal lobe , resultant uncal herniation/mass effect on the midbrain. Patient showed no evidence of neurological deficits and denied changes in vison, weakness. He was discharged  with plans to RTOR on 9/21 for craniotomy and resection of lesion     Intraop course complicated by slight decrease in MAPs to 80s for which he received two doses of ephedrine (5mg). Case otherwise uncomplicated, patient extubated and transferred to PACU in stable but guarded condition.     Postop, patient was stable.  POD1 overnight was hypertensive with systolic BP in the 160s-170s, and so was started on amlodipine 5mg.  BPs now well-controlled. Currently pain is well-controlled. Ambulating and tolerating regular diet.     ASSESSMENT & PLAN:   Neurologic:  Brain mass: S/p right craniotomy   - Likely mets, f/u path , frozen not sent  - Q4 neuro checks  - 9/22 AM CT head with postop changes and decreased midline shift (3mm compared to 10mm preop)  - Decadron  - Keppra  - HOB 45  - NSGY following, recs appreciated     Pain Control:   - Tylenol/oxy prn     Respiratory:  COPD: no home o2  - On home Trelegy Ellipta, restart  - Maintain 02 sat 88-92, currently on RA  - AM CXR stable  - IS/PT /OOBTC    Cardiovascular:  HTN, post op:   - No known cardiac hx  - Vasotec 1.25 for systolic 190, decreased to 160 will monitor  - started on Amlodopine 5mg daily, BPs currently well-controlled.  - EKG NSR  - 9/12/2020 Echo: EF 69%, g1dd, trace mitral valve regurg  - Cleared by dr Sanchez as low risk preop   - CE ( ordered by NSGY) neg x2, f/u 1100    Gastrointestinal/Nutrition:  - No acute issues  - regular diet    Prophylaxis:  - PPI  - Senna    Genitourinary/Renal:  - No acute issues  - d/c asher, f/u TOV  - F/u lytes and replete  - IV lock  - lactate cleared 0.9, no longer trending    Hematologic:  - No acute issues  - F/u labs    Prophylaxis:  - Holding pharmacological prophylaxis per NSGY, restart HSQ tomorrow  - SCDs, ambulation encouraged    Infectious Disease:  - No acute issues  - Periop Clinda (pcn allergy)  - F/u labs, monitor for S/S of infection     Endocrine:  - No acute issues  - ISS, FS ACqHS      Vital Signs Last 24 Hrs  T(C): 36.7 (22 Sep 2020 08:18), Max: 37 (22 Sep 2020 01:25)  T(F): 98 (22 Sep 2020 08:18), Max: 98.6 (22 Sep 2020 01:25)  HR: 65 (22 Sep 2020 10:30) (51 - 84)  BP: 143/84 (22 Sep 2020 10:30) (131/67 - 165/115)  BP(mean): 127 (22 Sep 2020 09:00) (94 - 127)  RR: 24 (22 Sep 2020 10:30) (15 - 24)  SpO2: 100% (22 Sep 2020 10:30) (98% - 100%)  I&O's Summary    21 Sep 2020 07:01  -  22 Sep 2020 07:00  --------------------------------------------------------  IN: 1090 mL / OUT: 1005 mL / NET: 85 mL    22 Sep 2020 07:01  -  22 Sep 2020 12:39  --------------------------------------------------------  IN: 360 mL / OUT: 440 mL / NET: -80 mL        MEDICATIONS  (STANDING):  amLODIPine   Tablet 5 milliGRAM(s) Oral daily  budesonide  80 MICROgram(s)/formoterol 4.5 MICROgram(s) Inhaler 2 Puff(s) Inhalation two times a day  clindamycin IVPB 600 milliGRAM(s) IV Intermittent every 8 hours  dexAMETHasone  Injectable 6 milliGRAM(s) IV Push every 6 hours  insulin regular  human corrective regimen sliding scale   IV Push every 4 hours  levETIRAcetam  IVPB 500 milliGRAM(s) IV Intermittent every 12 hours  pantoprazole    Tablet 40 milliGRAM(s) Oral before breakfast  senna 1 Tablet(s) Oral at bedtime    MEDICATIONS  (PRN):  acetaminophen   Tablet .. 650 milliGRAM(s) Oral every 6 hours PRN Mild Pain (1 - 3)  glucagon  Injectable 1 milliGRAM(s) IntraMuscular once PRN Glucose LESS THAN 70 milligrams/deciliter  oxyCODONE    IR 5 milliGRAM(s) Oral every 6 hours PRN Moderate Pain (4 - 6)  oxyCODONE    IR 10 milliGRAM(s) Oral every 6 hours PRN Severe Pain (7 - 10)        LABS                                            12.8                  Neurophils% (auto):   x      (09-22 @ 05:40):    14.36)-----------(185          Lymphocytes% (auto):  x                                             37.8                   Eosinphils% (auto):   x        Manual%: Neutrophils x    ; Lymphocytes x    ; Eosinophils x    ; Bands%: x    ; Blasts x                                    x      |  x      |  x                   Calcium: x     / iCa: x      (09-22 @ 05:40)    ----------------------------<  x         Magnesium: x                                x       |  x      |  x                Phosphorous: 4.4      TPro  5.5    /  Alb  3.7    /  TBili  0.5    /  DBili  x      /  AST  12     /  ALT  32     /  AlkPhos  56     21 Sep 2020 23:37    ( 09-21 @ 23:37 )   PT: 14.10 sec;   INR: 1.23 ratio  aPTT: 21.0 sec    Discussed with Ginger 9/22 @3878.

## 2020-09-22 NOTE — PHYSICAL THERAPY INITIAL EVALUATION ADULT - DIAGNOSIS, PT EVAL
approached RN Stacie, and parents, Елена and Dmitri, in the NICU in follow up to SW consult. SW met with Елена the previous day regarding WIC, in which she recalled.     SW asked about further needs, and both parents stated that they are all set. SW asked about in-home nursing needs, and both parents feel this is unnecessary, as the baby will not require this upon discharge.     Stacie was unaware of the SW consult and suggested SW ask NP for further details.    SW left both parents a voicemail regarding Елена's insurance coverage, given NP's note about reasoning for SW consult.    MELQUIADES Friedman, APSW    [facility]   Cell: [phone]  On weekends please call: [phone]  
Rehab s/p craniotomy

## 2020-09-22 NOTE — PROGRESS NOTE ADULT - ATTENDING COMMENTS
59 y/o male with Metastatic Brain Tumor.  S/P right craniotomy.  Lung cancer.  COPD.  Hypertensive emergency.  Lactic acidosis.  Acute postoperative pain.    PLAN:  - CT head this am; continue neuro checks; Neurosurgery f/u.  - respiratory: encourage incentive spirometer; OOB  - Cardio: keep normotensive  - GI: start po diet  - Renal: follow serum electrolytes and UOP.

## 2020-09-22 NOTE — PROGRESS NOTE ADULT - SUBJECTIVE AND OBJECTIVE BOX
CHERI WELCH  [id number]  60yMale POD 0 s/p right craniotomy for resection of brain mass (possible mets from lung ca) upgraded to SICU for q1 neuro checks          Indication for ICU admission: q1 neuro checks   Admit Date: 9/21  ICU Date: 9/21  OR Date: 9/21    penicillin (Hives)    PAST MEDICAL & SURGICAL HISTORY:  Malignant neoplasm of upper lobe of left lung    Anxiety    COPD (chronic obstructive pulmonary disease)    Status post lung surgery  Left upper lobe removed    H/O spinal fusion      Home Medications:  Trelegy Ellipta inhalation powder: 1 puff(s) inhaled once a day (21 Sep 2020 14:24)        24HRS EVENT: neuro intact, htn x1 systolic 190 given vasotec, am cth   Neurologic:  Brain mass: S/p right craniotomy   - Likely mets, f/u path , frozen not sent  - Q1 neuro checks  - AM CTH  - Decadron  - Keppra  - HOB 45  - NSGY following, recs appreciated     Pain Control:   - Tylenol/oxy prn     Respiratory:  COPD: no home o2  - On home Trelegy Ellipta, restart  - Maintain 02 sat 88-92  - AM CXR  - IS/PT /OOBTC when cleared by NDGY    Cardiovascular:  HTN, post op:   - No known cardiac hx  - Vasotec 1.25 for systolic 190, decreased to 160 will monitor  - EKG NSR  - 9/12/2020 Echo: EF 69%, g1dd, trace mitral valve regurg  - Cleared by dr Sanchez as low risk preop     Gastrointestinal/Nutrition:  - No acute issues  - NPO for am CTH    Prophylaxis:  - PPI  - Senna    Genitourinary/Renal:  - No acute issues  - Sanchez in place, strict I/Os  - F/u lytes and replete  - IVF while NPO    Hematologic:  - No acute issues  - F/u labs    Prophylaxis:  - Holding pharmacological prophylaxis per NSGY  - SCD    Infectious Disease:  - No acute issues  - Periop Clinda ( pcn allergy)  - F/u labs, monitor for S/S of infection     Endocrine:  - No acute issues  - ISS, FSq4 while NPO/on decadron     Disposition: SICU    DVT PTX: SCD    GI PTX: PPI    ***Tubes/Lines/Drains  ***  Peripheral IV  Sanchez (9/21  left radial benigno (9/21)    REVIEW OF SYSTEMS    [ x] A ten-point review of systems was otherwise negative except as noted.  [ ] Due to altered mental status/intubation, subjective information were not able to be obtained from the patient. History was obtained, to the extent possible, from review of the chart and collateral sources of information.                   CHERI WELCH  [id number]  60yMale POD 0 s/p right craniotomy for resection of brain mass (possible mets from lung ca) upgraded to SICU for q1 neuro checks      Indication for ICU admission: q1 neuro checks   Admit Date:   ICU Date:   OR Date:     penicillin (Hives)    PAST MEDICAL & SURGICAL HISTORY:  Malignant neoplasm of upper lobe of left lung    Anxiety    COPD (chronic obstructive pulmonary disease)    Status post lung surgery  Left upper lobe removed    H/O spinal fusion    Home Medications:  Trelegy Ellipta inhalation powder: 1 puff(s) inhaled once a day (21 Sep 2020 14:24)      24HRS EVENT: neuro intact, htn x1 systolic 190 given vasotec, am cth   Neurologic:  Brain mass: S/p right craniotomy   - Likely mets, f/u path , frozen not sent  - Q1 neuro checks  - AM CTH  - Decadron  - Keppra  - HOB 45  - NSGY following, recs appreciated     Pain Control:   - Tylenol/oxy prn     Respiratory:  COPD: no home o2  - On home Trelegy Ellipta, restart  - Maintain 02 sat 88-92  - AM CXR  - IS/PT /OOBTC when cleared by NDGY    Cardiovascular:  HTN, post op:   - No known cardiac hx  - Vasotec 1.25 for systolic 190, decreased to 160 will monitor  - EKG NSR  - 2020 Echo: EF 69%, g1dd, trace mitral valve regurg  - Cleared by dr Sanchez as low risk preop     Gastrointestinal/Nutrition:  - No acute issues  - NPO for am CTH    Prophylaxis:  - PPI  - Senna    Genitourinary/Renal:  - No acute issues  - Sanchez in place, strict I/Os  - F/u lytes and replete  - IVF while NPO    Hematologic:  - No acute issues  - F/u labs    Prophylaxis:  - Holding pharmacological prophylaxis per NSGY  - SCD    Infectious Disease:  - No acute issues  - Periop Clinda ( pcn allergy)  - F/u labs, monitor for S/S of infection     Endocrine:  - No acute issues  - ISS, FSq4 while NPO/on decadron     Disposition: SICU    DVT PTX: SCD    GI PTX: PPI    ***Tubes/Lines/Drains  ***  Peripheral IV  Sanchez (  left radial benigno ()    REVIEW OF SYSTEMS    [ x] A ten-point review of systems was otherwise negative except as noted.  [ ] Due to altered mental status/intubation, subjective information were not able to be obtained from the patient. History was obtained, to the extent possible, from review of the chart and collateral sources of information.    Daily Height in cm: 177.8 (21 Sep 2020 15:32)    Daily Weight in k.8 (22 Sep 2020 01:25)    Diet, NPO:   Except Medications (20 @ 00:08)    CURRENT MEDS:  Neurologic Medications  acetaminophen   Tablet .. 650 milliGRAM(s) Oral every 6 hours PRN Mild Pain (1 - 3)  levETIRAcetam  IVPB 500 milliGRAM(s) IV Intermittent every 12 hours  oxyCODONE    IR 5 milliGRAM(s) Oral every 6 hours PRN Moderate Pain (4 - 6)  oxyCODONE    IR 10 milliGRAM(s) Oral every 6 hours PRN Severe Pain (7 - 10)    Respiratory Medications  budesonide  80 MICROgram(s)/formoterol 4.5 MICROgram(s) Inhaler 2 Puff(s) Inhalation two times a day    Cardiovascular Medications    Gastrointestinal Medications  pantoprazole    Tablet 40 milliGRAM(s) Oral before breakfast  senna 1 Tablet(s) Oral at bedtime  sodium chloride 0.9%. 1000 milliLiter(s) IV Continuous <Continuous>    Genitourinary Medications    Hematologic/Oncologic Medications    Antimicrobial/Immunologic Medications  clindamycin IVPB 600 milliGRAM(s) IV Intermittent every 8 hours    Endocrine/Metabolic Medications  dexAMETHasone  Injectable 6 milliGRAM(s) IV Push every 6 hours  glucagon  Injectable 1 milliGRAM(s) IntraMuscular once PRN Glucose LESS THAN 70 milligrams/deciliter  insulin regular  human corrective regimen sliding scale   IV Push every 4 hours    Topical/Other Medications    ICU Vital Signs Last 24 Hrs  T(C): 37 (22 Sep 2020 04:00), Max: 37 (22 Sep 2020 01:25)  T(F): 98.6 (22 Sep 2020 04:00), Max: 98.6 (22 Sep 2020 01:25)  HR: 56 (22 Sep 2020 08:00) (51 - 84)  BP: 134/86 (22 Sep 2020 08:00) (131/67 - 158/87)  BP(mean): 105 (22 Sep 2020 08:00) (94 - 117)  ABP: 153/75 (22 Sep 2020 08:00) (141/66 - 185/97)  ABP(mean): 106 (22 Sep 2020 08:00) (92 - 124)  RR: 22 (22 Sep 2020 08:00) (15 - 22)  SpO2: 100% (22 Sep 2020 08:00) (98% - 100%)    I&O's Summary    21 Sep 2020 07:01  -  22 Sep 2020 07:00  --------------------------------------------------------  IN: 1090 mL / OUT: 1005 mL / NET: 85 mL    I&O's Detail    21 Sep 2020 07:  -  22 Sep 2020 07:00  --------------------------------------------------------  IN:    Lactated Ringers: 250 mL    sodium chloride 0.9%: 840 mL  Total IN: 1090 mL    OUT:    Indwelling Catheter - Urethral (mL): 1005 mL  Total OUT: 1005 mL    Total NET: 85 mL    PHYSICAL EXAM:    General/Neuro  RASS:             GCS:     = E   / V   / M      Deficits:                             alert & oriented x 3, no focal deficits  Pupils:    Lungs:  clear to auscultation, Normal expansion/effort.     Cardiovascular : S1, S2.  Regular rate and rhythm.  Peripheral edema   Cardiac Rhythm: Normal Sinus Rhythm    GI: Abdomen soft, Non-tender, Non-distended.    Gastrostomy / Jejunostomy tube in place.  Nasogastric tube in place.  Colostomy / Ileostomy.    Wound:    Extremities: Extremities warm, pink, well-perfused. Pulses:Rt     Lt    Derm: Good skin turgor, no skin breakdown.      :       Sanchez catheter in place.    CXR:   **F/U AM CXR**    LABS:  CAPILLARY BLOOD GLUCOSE    POCT Blood Glucose.: 122 mg/dL (22 Sep 2020 05:06)  POCT Blood Glucose.: 112 mg/dL (22 Sep 2020 01:13)  POCT Blood Glucose.: 114 mg/dL (21 Sep 2020 21:55)                          12.8   14.36 )-----------( 185      ( 22 Sep 2020 05:40 )             37.8       09-    136  |  98  |  22<H>  ----------------------------<  116<H>  4.5   |  26  |  0.7    Ca    8.7      21 Sep 2020 23:37  Phos  4.4       Mg     1.9         TPro  5.5<L>  /  Alb  3.7  /  TBili  0.5  /  DBili  x   /  AST  12  /  ALT  32  /  AlkPhos  56  09-21      PT/INR - ( 21 Sep 2020 23:37 )   PT: 14.10 sec;   INR: 1.23 ratio      PTT - ( 21 Sep 2020 23:37 )  PTT:21.0 sec  CARDIAC MARKERS ( 22 Sep 2020 05:40 )  x     / <0.01 ng/mL / 271 U/L / x     / 11.3 ng/mL  CARDIAC MARKERS ( 21 Sep 2020 23:37 )  x     / <0.01 ng/mL / x     / x     / x

## 2020-09-22 NOTE — PROGRESS NOTE ADULT - SUBJECTIVE AND OBJECTIVE BOX
Subjective: 60yMale with a pmhx of D49.6 / 32291, 88712    ^D49.6 / 93473, 91308    No pertinent family history in first degree relatives    Handoff    MEWS Score    Malignant neoplasm of upper lobe of left lung    Anxiety    COPD (chronic obstructive pulmonary disease)    Metastatic cancer to brain    Metastatic cancer to brain    Craniotomy, using frameless stereotaxy, for neoplasm excision    Status post lung surgery    H/O spinal fusion    [id number]      POD#1 s/p Right Crani for resection of brain tumor  Pt seen and examined at bedside, denies HA, confusion dizziness and blurring vision but admits to some soreness when chewing.        Allergies    penicillin (Hives)    Intolerances        Vital Signs Last 24 Hrs  T(C): 36.7 (22 Sep 2020 08:18), Max: 37 (22 Sep 2020 01:25)  T(F): 98 (22 Sep 2020 08:18), Max: 98.6 (22 Sep 2020 01:25)  HR: 63 (22 Sep 2020 09:00) (51 - 84)  BP: 165/115 (22 Sep 2020 09:00) (131/67 - 165/115)  BP(mean): 127 (22 Sep 2020 09:00) (94 - 127)  RR: 24 (22 Sep 2020 09:00) (15 - 24)  SpO2: 100% (22 Sep 2020 09:00) (98% - 100%)      acetaminophen   Tablet .. 650 milliGRAM(s) Oral every 6 hours PRN  amLODIPine   Tablet 5 milliGRAM(s) Oral daily  budesonide  80 MICROgram(s)/formoterol 4.5 MICROgram(s) Inhaler 2 Puff(s) Inhalation two times a day  clindamycin IVPB 600 milliGRAM(s) IV Intermittent every 8 hours  dexAMETHasone  Injectable 6 milliGRAM(s) IV Push every 6 hours  glucagon  Injectable 1 milliGRAM(s) IntraMuscular once PRN  insulin regular  human corrective regimen sliding scale   IV Push every 4 hours  levETIRAcetam  IVPB 500 milliGRAM(s) IV Intermittent every 12 hours  oxyCODONE    IR 5 milliGRAM(s) Oral every 6 hours PRN  oxyCODONE    IR 10 milliGRAM(s) Oral every 6 hours PRN  pantoprazole    Tablet 40 milliGRAM(s) Oral before breakfast  senna 1 Tablet(s) Oral at bedtime  sodium chloride 0.9%. 1000 milliLiter(s) IV Continuous <Continuous>        09-21-20 @ 07:01  -  09-22-20 @ 07:00  --------------------------------------------------------  IN: 1090 mL / OUT: 1005 mL / NET: 85 mL    09-22-20 @ 07:01  -  09-22-20 @ 10:16  --------------------------------------------------------  IN: 360 mL / OUT: 350 mL / NET: 10 mL        REVIEW OF SYSTEMS    [x ] A ten-point review of systems was otherwise negative except as noted.  [ ] Due to altered mental status/intubation, subjective information were not able to be obtained from the patient. History was obtained, to the extent possible, from review of the chart and collateral sources of information.      Exam:  AAOX3. Verbal function intact, following commands  CN II-XII grossly intact  tongue midline, facial motions symmetric  PERRLA, EOMI  Pronator Drift: none  Finger to Nose intact  Motor: MAEx4, 5/5 power in b/l UE and LE  Sensation: intact to touch in all extremities        CBC Full  -  ( 22 Sep 2020 05:40 )  WBC Count : 14.36 K/uL  RBC Count : 4.41 M/uL  Hemoglobin : 12.8 g/dL  Hematocrit : 37.8 %  Platelet Count - Automated : 185 K/uL  Mean Cell Volume : 85.7 fL  Mean Cell Hemoglobin : 29.0 pg  Mean Cell Hemoglobin Concentration : 33.9 g/dL  Auto Neutrophil # : x  Auto Lymphocyte # : x  Auto Monocyte # : x  Auto Eosinophil # : x  Auto Basophil # : x  Auto Neutrophil % : x  Auto Lymphocyte % : x  Auto Monocyte % : x  Auto Eosinophil % : x  Auto Basophil % : x    09-21    136  |  98  |  22<H>  ----------------------------<  116<H>  4.5   |  26  |  0.7    Ca    8.7      21 Sep 2020 23:37  Phos  4.4     09-22  Mg     1.9     09-21    TPro  5.5<L>  /  Alb  3.7  /  TBili  0.5  /  DBili  x   /  AST  12  /  ALT  32  /  AlkPhos  56  09-21    PT/INR - ( 21 Sep 2020 23:37 )   PT: 14.10 sec;   INR: 1.23 ratio         PTT - ( 21 Sep 2020 23:37 )  PTT:21.0 sec        Wound: clean, dry and intact    Imaging:     < from: CT Head No Cont (09.22.20 @ 09:47) >  IMPRESSION:    1.  Interval right parietal craniotomy with expected postsurgical changes as described including a right temporal lobe surgical cavity.    2.  Decreased mass effect with right-to-left midline shift measuring 3 mm, previously 10 mm on the preoperative study.        ZULEYMA CURIEL M.D., ATTENDING RADIOLOGIST  This document has been electronically signed. Sep 22 2020 10:14AM    < end of copied text >    Assessment/Plan:   59 y/o male s/p Right crani for resection of brain tumor     -Head CT stable, post surgical changes   -ok to advance diet  -D/C asher  -D/C A line  -keep SBP < 160  -ok for neuro checks Q4-6 hrs  -Hemonc f/u  -Medical management per SICU

## 2020-09-22 NOTE — CONSULT NOTE ADULT - SUBJECTIVE AND OBJECTIVE BOX
HPI:  60 year old male with hx of COPD not on home O2 and lung cancer diagnosed in September s/p chemo/rad presents from [facility] with new onset headaches starting 3 days ago.  Headache is right sided, mild, and non-radiating.  At [facility] he had a CT head done showing an ill-defined superior temporal mass with surrounding vasogenic edema with 0.9 cm midline shift to the left.  He denies any changes in vision, denies any weakness.    Pt was admitted and discharged with plan to return for OR with Dr Rhodes for Right sided Crani for resection of brain lesion.   (21 Sep 2020 15:30). s/p right craniotomy for brain tumor resection on 9/21      PAST MEDICAL & SURGICAL HISTORY:  Malignant neoplasm of upper lobe of left lung    Anxiety    COPD (chronic obstructive pulmonary disease)    Status post lung surgery  Left upper lobe removed    H/O spinal fusion        Hospital Course:    TODAY'S SUBJECTIVE & REVIEW OF SYMPTOMS:     Constitutional WNL   Cardio WNL   Resp WNL   GI WNL  Heme WNL  Endo WNL  Skin WNL  MSK WNL  Neuro WNL  Cognitive WNL  Psych WNL      MEDICATIONS  (STANDING):  amLODIPine   Tablet 5 milliGRAM(s) Oral daily  budesonide  80 MICROgram(s)/formoterol 4.5 MICROgram(s) Inhaler 2 Puff(s) Inhalation two times a day  clindamycin IVPB 600 milliGRAM(s) IV Intermittent every 8 hours  dexAMETHasone  Injectable 6 milliGRAM(s) IV Push every 6 hours  insulin lispro (HumaLOG) corrective regimen sliding scale   SubCutaneous three times a day before meals  insulin lispro (HumaLOG) corrective regimen sliding scale   SubCutaneous at bedtime  levETIRAcetam  IVPB 500 milliGRAM(s) IV Intermittent every 12 hours  pantoprazole    Tablet 40 milliGRAM(s) Oral before breakfast  senna 1 Tablet(s) Oral at bedtime    MEDICATIONS  (PRN):  acetaminophen   Tablet .. 650 milliGRAM(s) Oral every 6 hours PRN Mild Pain (1 - 3)  glucagon  Injectable 1 milliGRAM(s) IntraMuscular once PRN Glucose LESS THAN 70 milligrams/deciliter  oxyCODONE    IR 5 milliGRAM(s) Oral every 6 hours PRN Moderate Pain (4 - 6)  oxyCODONE    IR 10 milliGRAM(s) Oral every 6 hours PRN Severe Pain (7 - 10)      FAMILY HISTORY:  No pertinent family history in first degree relatives        Allergies    penicillin (Hives)    Intolerances        SOCIAL HISTORY:    [  ] Etoh  [  ] Smoking  [  ] Substance abuse     Home Environment:  [ x ] Home Alone  [x ] Lives with Family  [  ] Home Health Aid    Dwelling:  [  ] Apartment  [x  ] Private House  [  ] Adult Home  [  ] Skilled Nursing Facility      [  ] Short Term  [  ] Long Term  [x  ] Stairs       Elevator [  ]    FUNCTIONAL STATUS PTA: (Check all that apply)  Ambulation: [ x  ]Independent    [  ] Dependent     [  ] Non-Ambulatory  Assistive Device: [  ] SA Cane  [  ]  Q Cane  [  ] Walker  [  ]  Wheelchair  ADL : [ x ] Independent  [  ]  Dependent       Vital Signs Last 24 Hrs  T(C): 36.7 (22 Sep 2020 08:18), Max: 37 (22 Sep 2020 01:25)  T(F): 98 (22 Sep 2020 08:18), Max: 98.6 (22 Sep 2020 01:25)  HR: 59 (22 Sep 2020 12:00) (51 - 84)  BP: 145/76 (22 Sep 2020 12:00) (131/67 - 165/115)  BP(mean): 127 (22 Sep 2020 09:00) (94 - 127)  RR: 20 (22 Sep 2020 12:00) (15 - 24)  SpO2: 100% (22 Sep 2020 12:00) (98% - 100%)      PHYSICAL EXAM: Awake & Alert  GENERAL: NAD  HEAD:  Normocephalic  CHEST/LUNG: Clear   HEART: S1S2+  ABDOMEN: Soft, Nontender  EXTREMITIES:  no calf tenderness    NERVOUS SYSTEM:  Cranial Nerves 2-12 intact [  ] Abnormal  [  ]  ROM: WFL all extremities [x  ]  Abnormal [  ]  Motor Strength: WFL all extremities  [x  ]  Abnormal [  ]  Sensation: intact to light touch [x  ] Abnormal [  ]    FUNCTIONAL STATUS:  Bed Mobility: Independent [  ]  Supervision [x  ]  Needs Assistance [  ]  N/A [  ]  Transfers: Independent [  ]  Supervision [x  ]  Needs Assistance [  ]  N/A [  ]   Ambulation: Independent [  ]  Supervision [ x ]  Needs Assistance [  ]  N/A [  ]  ADL: Independent [  ] Requires Assistance [  ] N/A [  ]      LABS:                        12.8   14.36 )-----------( 185      ( 22 Sep 2020 05:40 )             37.8     09-21    136  |  98  |  22<H>  ----------------------------<  116<H>  4.5   |  26  |  0.7    Ca    8.7      21 Sep 2020 23:37  Phos  4.4     09-22  Mg     1.9     09-21    TPro  5.5<L>  /  Alb  3.7  /  TBili  0.5  /  DBili  x   /  AST  12  /  ALT  32  /  AlkPhos  56  09-21    PT/INR - ( 21 Sep 2020 23:37 )   PT: 14.10 sec;   INR: 1.23 ratio         PTT - ( 21 Sep 2020 23:37 )  PTT:21.0 sec      RADIOLOGY & ADDITIONAL STUDIES:

## 2020-09-22 NOTE — PROGRESS NOTE ADULT - SUBJECTIVE AND OBJECTIVE BOX
INTERVAL HPI/OVERNIGHT EVENTS:    HPI:  61 y/o male s/p R crani for resection of brain tumor POD #0. Seen and examined at bedisde, feeling well, pain is controlled, no acute issues, no complaints. Denies any fevers, chills, N/V, SOB.     PAST MEDICAL & SURGICAL HISTORY:  Malignant neoplasm of upper lobe of left lung    Anxiety    COPD (chronic obstructive pulmonary disease)    Status post lung surgery  Left upper lobe removed    H/O spinal fusion        MEDICATIONS  (STANDING):  budesonide  80 MICROgram(s)/formoterol 4.5 MICROgram(s) Inhaler 2 Puff(s) Inhalation two times a day  clindamycin IVPB 600 milliGRAM(s) IV Intermittent every 8 hours  dexAMETHasone  Injectable 6 milliGRAM(s) IV Push every 6 hours  dextrose 5%. 1000 milliLiter(s) (50 mL/Hr) IV Continuous <Continuous>  dextrose 50% Injectable 12.5 Gram(s) IV Push once  insulin regular  human corrective regimen sliding scale   IV Push every 4 hours  levETIRAcetam  IVPB 500 milliGRAM(s) IV Intermittent every 12 hours  pantoprazole    Tablet 40 milliGRAM(s) Oral before breakfast  senna 1 Tablet(s) Oral at bedtime  sodium chloride 0.9%. 1000 milliLiter(s) (120 mL/Hr) IV Continuous <Continuous>    MEDICATIONS  (PRN):  acetaminophen   Tablet .. 650 milliGRAM(s) Oral every 6 hours PRN Mild Pain (1 - 3)  dextrose 40% Gel 15 Gram(s) Oral once PRN Blood Glucose LESS THAN 70 milliGRAM(s)/deciliter  glucagon  Injectable 1 milliGRAM(s) IntraMuscular once PRN Glucose LESS THAN 70 milligrams/deciliter  ondansetron Injectable 4 milliGRAM(s) IV Push once PRN Nausea and/or Vomiting  oxyCODONE    IR 5 milliGRAM(s) Oral every 6 hours PRN Moderate Pain (4 - 6)  oxyCODONE    IR 10 milliGRAM(s) Oral every 6 hours PRN Severe Pain (7 - 10)      Allergies    penicillin (Hives)    Intolerances          Vital Signs Last 24 Hrs  T(C): 36.4 (21 Sep 2020 22:51), Max: 36.8 (21 Sep 2020 21:51)  T(F): 97.6 (21 Sep 2020 22:51), Max: 98.2 (21 Sep 2020 21:51)  HR: 60 (21 Sep 2020 22:51) (60 - 78)  BP: 148/75 (21 Sep 2020 22:51) (136/77 - 155/85)  BP(mean): 116 (21 Sep 2020 22:21) (108 - 116)  RR: 16 (21 Sep 2020 22:51) (16 - 20)  SpO2: 100% (21 Sep 2020 22:51) (98% - 100%)    PHYSICAL EXAM:    GENERAL: NAD, well-groomed, well-developed  HEAD:  Dressing c/d/i  EYES: EOMI, PERRLA, conjunctiva and sclera clear  NERVOUS SYSTEM:  Awake  alert oriented to self, place situation   Fund of knowledge, recent and remote memory are intact   Mood; normal affect   CN II-XII intact PERRRL, EOMI, no ptosis, no Nystagmus, normal shoulder shrug   Face symmetrical tongue is midline speech is clear and fluent  Motor: 5/5 UE/LE all muscle groups   Sensory: No deficit to light touch   Gait is not tested      EXTREMITIES:  2+ Peripheral Pulses, No clubbing, cyanosis, or edema      LABS:                        13.0   13.70 )-----------( 194      ( 21 Sep 2020 23:37 )             38.8     09-21    136  |  98  |  22<H>  ----------------------------<  116<H>  4.5   |  26  |  0.7    Ca    8.7      21 Sep 2020 23:37  Mg     1.9     09-21    TPro  5.5<L>  /  Alb  3.7  /  TBili  0.5  /  DBili  x   /  AST  12  /  ALT  32  /  AlkPhos  56  09-21    PT/INR - ( 21 Sep 2020 23:37 )   PT: 14.10 sec;   INR: 1.23 ratio         PTT - ( 21 Sep 2020 23:37 )  PTT:21.0 sec

## 2020-09-23 LAB
ANION GAP SERPL CALC-SCNC: 13 MMOL/L — SIGNIFICANT CHANGE UP (ref 7–14)
ANION GAP SERPL CALC-SCNC: 6 MMOL/L — LOW (ref 7–14)
BUN SERPL-MCNC: 25 MG/DL — HIGH (ref 10–20)
BUN SERPL-MCNC: 31 MG/DL — HIGH (ref 10–20)
CALCIUM SERPL-MCNC: 9 MG/DL — SIGNIFICANT CHANGE UP (ref 8.5–10.1)
CALCIUM SERPL-MCNC: 9.1 MG/DL — SIGNIFICANT CHANGE UP (ref 8.5–10.1)
CHLORIDE SERPL-SCNC: 95 MMOL/L — LOW (ref 98–110)
CHLORIDE SERPL-SCNC: 96 MMOL/L — LOW (ref 98–110)
CO2 SERPL-SCNC: 24 MMOL/L — SIGNIFICANT CHANGE UP (ref 17–32)
CO2 SERPL-SCNC: 30 MMOL/L — SIGNIFICANT CHANGE UP (ref 17–32)
CREAT SERPL-MCNC: 0.7 MG/DL — SIGNIFICANT CHANGE UP (ref 0.7–1.5)
CREAT SERPL-MCNC: 0.7 MG/DL — SIGNIFICANT CHANGE UP (ref 0.7–1.5)
GLUCOSE BLDC GLUCOMTR-MCNC: 119 MG/DL — HIGH (ref 70–99)
GLUCOSE BLDC GLUCOMTR-MCNC: 127 MG/DL — HIGH (ref 70–99)
GLUCOSE BLDC GLUCOMTR-MCNC: 146 MG/DL — HIGH (ref 70–99)
GLUCOSE BLDC GLUCOMTR-MCNC: 148 MG/DL — HIGH (ref 70–99)
GLUCOSE SERPL-MCNC: 110 MG/DL — HIGH (ref 70–99)
GLUCOSE SERPL-MCNC: 128 MG/DL — HIGH (ref 70–99)
HCT VFR BLD CALC: 40.9 % — LOW (ref 42–52)
HGB BLD-MCNC: 13.7 G/DL — LOW (ref 14–18)
MAGNESIUM SERPL-MCNC: 2.1 MG/DL — SIGNIFICANT CHANGE UP (ref 1.8–2.4)
MAGNESIUM SERPL-MCNC: 2.1 MG/DL — SIGNIFICANT CHANGE UP (ref 1.8–2.4)
MCHC RBC-ENTMCNC: 28.3 PG — SIGNIFICANT CHANGE UP (ref 27–31)
MCHC RBC-ENTMCNC: 33.5 G/DL — SIGNIFICANT CHANGE UP (ref 32–37)
MCV RBC AUTO: 84.5 FL — SIGNIFICANT CHANGE UP (ref 80–94)
NRBC # BLD: 0 /100 WBCS — SIGNIFICANT CHANGE UP (ref 0–0)
PHOSPHATE SERPL-MCNC: 3.8 MG/DL — SIGNIFICANT CHANGE UP (ref 2.1–4.9)
PLATELET # BLD AUTO: 220 K/UL — SIGNIFICANT CHANGE UP (ref 130–400)
POTASSIUM SERPL-MCNC: 4.2 MMOL/L — SIGNIFICANT CHANGE UP (ref 3.5–5)
POTASSIUM SERPL-MCNC: 4.6 MMOL/L — SIGNIFICANT CHANGE UP (ref 3.5–5)
POTASSIUM SERPL-SCNC: 4.2 MMOL/L — SIGNIFICANT CHANGE UP (ref 3.5–5)
POTASSIUM SERPL-SCNC: 4.6 MMOL/L — SIGNIFICANT CHANGE UP (ref 3.5–5)
RBC # BLD: 4.84 M/UL — SIGNIFICANT CHANGE UP (ref 4.7–6.1)
RBC # FLD: 15.1 % — HIGH (ref 11.5–14.5)
SARS-COV-2 IGG SERPL QL IA: NEGATIVE — SIGNIFICANT CHANGE UP
SARS-COV-2 IGM SERPL IA-ACNC: <0.1 INDEX — SIGNIFICANT CHANGE UP
SODIUM SERPL-SCNC: 132 MMOL/L — LOW (ref 135–146)
SODIUM SERPL-SCNC: 132 MMOL/L — LOW (ref 135–146)
SURGICAL PATHOLOGY STUDY: SIGNIFICANT CHANGE UP
WBC # BLD: 16.79 K/UL — HIGH (ref 4.8–10.8)
WBC # FLD AUTO: 16.79 K/UL — HIGH (ref 4.8–10.8)

## 2020-09-23 RX ORDER — DEXAMETHASONE 0.5 MG/5ML
2 ELIXIR ORAL EVERY 6 HOURS
Refills: 0 | Status: CANCELLED | OUTPATIENT
Start: 2020-09-27 | End: 2020-09-24

## 2020-09-23 RX ORDER — DEXAMETHASONE 0.5 MG/5ML
4 ELIXIR ORAL EVERY 6 HOURS
Refills: 0 | Status: DISCONTINUED | OUTPATIENT
Start: 2020-09-23 | End: 2020-09-24

## 2020-09-23 RX ORDER — DEXAMETHASONE 0.5 MG/5ML
ELIXIR ORAL
Refills: 0 | Status: DISCONTINUED | OUTPATIENT
Start: 2020-09-23 | End: 2020-09-24

## 2020-09-23 RX ORDER — DEXAMETHASONE 0.5 MG/5ML
2 ELIXIR ORAL EVERY 8 HOURS
Refills: 0 | Status: CANCELLED | OUTPATIENT
Start: 2020-09-30 | End: 2020-09-24

## 2020-09-23 RX ORDER — SODIUM CHLORIDE 9 MG/ML
1 INJECTION INTRAMUSCULAR; INTRAVENOUS; SUBCUTANEOUS EVERY 8 HOURS
Refills: 0 | Status: COMPLETED | OUTPATIENT
Start: 2020-09-23 | End: 2020-09-24

## 2020-09-23 RX ORDER — DEXAMETHASONE 0.5 MG/5ML
2 ELIXIR ORAL EVERY 12 HOURS
Refills: 0 | Status: CANCELLED | OUTPATIENT
Start: 2020-10-02 | End: 2020-09-24

## 2020-09-23 RX ORDER — DEXAMETHASONE 0.5 MG/5ML
4 ELIXIR ORAL EVERY 8 HOURS
Refills: 0 | Status: DISCONTINUED | OUTPATIENT
Start: 2020-09-25 | End: 2020-09-24

## 2020-09-23 RX ORDER — DEXAMETHASONE 0.5 MG/5ML
4 ELIXIR ORAL EVERY 6 HOURS
Refills: 0 | Status: DISCONTINUED | OUTPATIENT
Start: 2020-09-23 | End: 2020-09-23

## 2020-09-23 RX ADMIN — LEVETIRACETAM 420 MILLIGRAM(S): 250 TABLET, FILM COATED ORAL at 17:08

## 2020-09-23 RX ADMIN — LEVETIRACETAM 420 MILLIGRAM(S): 250 TABLET, FILM COATED ORAL at 05:38

## 2020-09-23 RX ADMIN — HEPARIN SODIUM 5000 UNIT(S): 5000 INJECTION INTRAVENOUS; SUBCUTANEOUS at 05:39

## 2020-09-23 RX ADMIN — OXYCODONE HYDROCHLORIDE 10 MILLIGRAM(S): 5 TABLET ORAL at 06:27

## 2020-09-23 RX ADMIN — SENNA PLUS 1 TABLET(S): 8.6 TABLET ORAL at 21:09

## 2020-09-23 RX ADMIN — HEPARIN SODIUM 5000 UNIT(S): 5000 INJECTION INTRAVENOUS; SUBCUTANEOUS at 21:09

## 2020-09-23 RX ADMIN — SODIUM CHLORIDE 1 GRAM(S): 9 INJECTION INTRAMUSCULAR; INTRAVENOUS; SUBCUTANEOUS at 16:42

## 2020-09-23 RX ADMIN — OXYCODONE HYDROCHLORIDE 10 MILLIGRAM(S): 5 TABLET ORAL at 17:10

## 2020-09-23 RX ADMIN — Medication 4 MILLIGRAM(S): at 17:08

## 2020-09-23 RX ADMIN — Medication 4 MILLIGRAM(S): at 23:02

## 2020-09-23 RX ADMIN — PANTOPRAZOLE SODIUM 40 MILLIGRAM(S): 20 TABLET, DELAYED RELEASE ORAL at 05:38

## 2020-09-23 RX ADMIN — BUDESONIDE AND FORMOTEROL FUMARATE DIHYDRATE 2 PUFF(S): 160; 4.5 AEROSOL RESPIRATORY (INHALATION) at 21:10

## 2020-09-23 RX ADMIN — Medication 6 MILLIGRAM(S): at 05:37

## 2020-09-23 RX ADMIN — OXYCODONE HYDROCHLORIDE 10 MILLIGRAM(S): 5 TABLET ORAL at 18:00

## 2020-09-23 RX ADMIN — HEPARIN SODIUM 5000 UNIT(S): 5000 INJECTION INTRAVENOUS; SUBCUTANEOUS at 13:15

## 2020-09-23 RX ADMIN — SODIUM CHLORIDE 1 GRAM(S): 9 INJECTION INTRAMUSCULAR; INTRAVENOUS; SUBCUTANEOUS at 21:09

## 2020-09-23 RX ADMIN — AMLODIPINE BESYLATE 5 MILLIGRAM(S): 2.5 TABLET ORAL at 05:37

## 2020-09-23 RX ADMIN — Medication 4 MILLIGRAM(S): at 11:16

## 2020-09-23 NOTE — PROGRESS NOTE ADULT - SUBJECTIVE AND OBJECTIVE BOX
Subjective: 60yMale with a pmhx of D49.6 / 23845, 57836    ^D49.6 / 90254, 65991    No pertinent family history in first degree relatives    Handoff    MEWS Score    Malignant neoplasm of upper lobe of left lung    Anxiety    COPD (chronic obstructive pulmonary disease)    Metastatic cancer to brain    Metastatic cancer to brain    Craniotomy, using frameless stereotaxy, for neoplasm excision    Status post lung surgery    H/O spinal fusion    SysAdmin_VstLnk        POD#2 s/p Right Crani for resection of brain tumor  Pt seen and examined at bedside with Dr Rhodes.   Denies headache, dizziness and blurring vision.   Pt sts he feels good and wants to go home.  Needs to work with PT.     Allergies    penicillin (Hives)    Intolerances        Vital Signs Last 24 Hrs  T(C): 35.9 (23 Sep 2020 05:30), Max: 36.3 (22 Sep 2020 22:03)  T(F): 96.6 (23 Sep 2020 05:30), Max: 97.3 (22 Sep 2020 22:03)  HR: 54 (23 Sep 2020 05:30) (37 - 65)  BP: 150/91 (23 Sep 2020 05:30) (143/84 - 186/81)  BP(mean): --  RR: 20 (23 Sep 2020 05:30) (20 - 24)  SpO2: 100% (22 Sep 2020 12:00) (100% - 100%)      acetaminophen   Tablet .. 650 milliGRAM(s) Oral every 6 hours PRN  amLODIPine   Tablet 5 milliGRAM(s) Oral daily  budesonide  80 MICROgram(s)/formoterol 4.5 MICROgram(s) Inhaler 2 Puff(s) Inhalation two times a day  dexAMETHasone  Injectable 4 milliGRAM(s) IV Push every 6 hours  dextrose 50% Injectable 25 Gram(s) IV Push once  heparin   Injectable 5000 Unit(s) SubCutaneous every 8 hours  insulin lispro (HumaLOG) corrective regimen sliding scale   SubCutaneous Before meals and at bedtime  levETIRAcetam  IVPB 500 milliGRAM(s) IV Intermittent every 12 hours  oxyCODONE    IR 5 milliGRAM(s) Oral every 6 hours PRN  oxyCODONE    IR 10 milliGRAM(s) Oral every 6 hours PRN  pantoprazole    Tablet 40 milliGRAM(s) Oral before breakfast  senna 1 Tablet(s) Oral at bedtime        09-22-20 @ 07:01  -  09-23-20 @ 07:00  --------------------------------------------------------  IN: 360 mL / OUT: 740 mL / NET: -380 mL    09-23-20 @ 07:01 - 09-23-20 @ 10:18  --------------------------------------------------------  IN: 260 mL / OUT: 0 mL / NET: 260 mL        REVIEW OF SYSTEMS    [x ] A ten-point review of systems was otherwise negative except as noted.  [ ] Due to altered mental status/intubation, subjective information were not able to be obtained from the patient. History was obtained, to the extent possible, from review of the chart and collateral sources of information.      Exam:  AAOX3. Verbal function intact  follows commands  tongue midline  no droop  PERRL  tracks  Pronator Drift: none  Finger to Nose intact  Motor: MAEx4        CBC Full  -  ( 23 Sep 2020 00:50 )  WBC Count : 16.79 K/uL  RBC Count : 4.84 M/uL  Hemoglobin : 13.7 g/dL  Hematocrit : 40.9 %  Platelet Count - Automated : 220 K/uL  Mean Cell Volume : 84.5 fL  Mean Cell Hemoglobin : 28.3 pg  Mean Cell Hemoglobin Concentration : 33.5 g/dL  Auto Neutrophil # : x  Auto Lymphocyte # : x  Auto Monocyte # : x  Auto Eosinophil # : x  Auto Basophil # : x  Auto Neutrophil % : x  Auto Lymphocyte % : x  Auto Monocyte % : x  Auto Eosinophil % : x  Auto Basophil % : x    09-23    132<L>  |  95<L>  |  25<H>  ----------------------------<  110<H>  4.6   |  24  |  0.7    Ca    9.1      23 Sep 2020 00:50  Phos  3.8     09-23  Mg     2.1     09-23    TPro  5.5<L>  /  Alb  3.7  /  TBili  0.5  /  DBili  x   /  AST  12  /  ALT  32  /  AlkPhos  56  09-21    PT/INR - ( 21 Sep 2020 23:37 )   PT: 14.10 sec;   INR: 1.23 ratio         PTT - ( 21 Sep 2020 23:37 )  PTT:21.0 sec            Imaging: c< from: CT Head No Cont (09.22.20 @ 09:47) >  IMPRESSION:  1.  Interval right parietal craniotomy with expected postsurgical changes as described including a right temporal lobe surgical cavity.  2.  Decreased mass effect with right-to-left midline shift measuring 3 mm, previously 10 mm on the preoperative study.  < end of copied text >      Assessment/Plan:   PT/rehab  decrease Decadron to 4 q6  f/u OR pathology  Hem/onc follow up  d/w attg

## 2020-09-24 ENCOUNTER — TRANSCRIPTION ENCOUNTER (OUTPATIENT)
Age: 60
End: 2020-09-24

## 2020-09-24 VITALS
RESPIRATION RATE: 18 BRPM | HEART RATE: 76 BPM | SYSTOLIC BLOOD PRESSURE: 162 MMHG | TEMPERATURE: 97 F | DIASTOLIC BLOOD PRESSURE: 79 MMHG

## 2020-09-24 LAB
ANION GAP SERPL CALC-SCNC: 9 MMOL/L — SIGNIFICANT CHANGE UP (ref 7–14)
BUN SERPL-MCNC: 32 MG/DL — HIGH (ref 10–20)
CALCIUM SERPL-MCNC: 9.1 MG/DL — SIGNIFICANT CHANGE UP (ref 8.5–10.1)
CHLORIDE SERPL-SCNC: 100 MMOL/L — SIGNIFICANT CHANGE UP (ref 98–110)
CO2 SERPL-SCNC: 30 MMOL/L — SIGNIFICANT CHANGE UP (ref 17–32)
CREAT SERPL-MCNC: 0.7 MG/DL — SIGNIFICANT CHANGE UP (ref 0.7–1.5)
GLUCOSE BLDC GLUCOMTR-MCNC: 123 MG/DL — HIGH (ref 70–99)
GLUCOSE SERPL-MCNC: 109 MG/DL — HIGH (ref 70–99)
MAGNESIUM SERPL-MCNC: 2 MG/DL — SIGNIFICANT CHANGE UP (ref 1.8–2.4)
POTASSIUM SERPL-MCNC: 4.7 MMOL/L — SIGNIFICANT CHANGE UP (ref 3.5–5)
POTASSIUM SERPL-SCNC: 4.7 MMOL/L — SIGNIFICANT CHANGE UP (ref 3.5–5)
SODIUM SERPL-SCNC: 139 MMOL/L — SIGNIFICANT CHANGE UP (ref 135–146)

## 2020-09-24 RX ORDER — MAGNESIUM HYDROXIDE 400 MG/1
30 TABLET, CHEWABLE ORAL DAILY
Refills: 0 | Status: DISCONTINUED | OUTPATIENT
Start: 2020-09-24 | End: 2020-09-24

## 2020-09-24 RX ORDER — DEXAMETHASONE 0.5 MG/5ML
0 ELIXIR ORAL
Qty: 90 | Refills: 0
Start: 2020-09-24

## 2020-09-24 RX ORDER — SENNA PLUS 8.6 MG/1
1 TABLET ORAL
Qty: 0 | Refills: 0 | DISCHARGE
Start: 2020-09-24

## 2020-09-24 RX ORDER — ACETAMINOPHEN 500 MG
2 TABLET ORAL
Qty: 0 | Refills: 0 | DISCHARGE
Start: 2020-09-24

## 2020-09-24 RX ORDER — OXYCODONE HYDROCHLORIDE 5 MG/1
1 TABLET ORAL
Qty: 30 | Refills: 0
Start: 2020-09-24 | End: 2020-09-28

## 2020-09-24 RX ORDER — AMLODIPINE BESYLATE 2.5 MG/1
1 TABLET ORAL
Qty: 30 | Refills: 0
Start: 2020-09-24

## 2020-09-24 RX ORDER — POLYETHYLENE GLYCOL 3350 17 G/17G
17 POWDER, FOR SOLUTION ORAL DAILY
Refills: 0 | Status: DISCONTINUED | OUTPATIENT
Start: 2020-09-24 | End: 2020-09-24

## 2020-09-24 RX ORDER — DEXAMETHASONE 0.5 MG/5ML
2 ELIXIR ORAL
Qty: 60 | Refills: 0
Start: 2020-09-24

## 2020-09-24 RX ADMIN — LEVETIRACETAM 420 MILLIGRAM(S): 250 TABLET, FILM COATED ORAL at 05:07

## 2020-09-24 RX ADMIN — SODIUM CHLORIDE 1 GRAM(S): 9 INJECTION INTRAMUSCULAR; INTRAVENOUS; SUBCUTANEOUS at 05:06

## 2020-09-24 RX ADMIN — POLYETHYLENE GLYCOL 3350 17 GRAM(S): 17 POWDER, FOR SOLUTION ORAL at 11:42

## 2020-09-24 RX ADMIN — Medication 4 MILLIGRAM(S): at 05:07

## 2020-09-24 RX ADMIN — HEPARIN SODIUM 5000 UNIT(S): 5000 INJECTION INTRAVENOUS; SUBCUTANEOUS at 05:07

## 2020-09-24 RX ADMIN — Medication 4 MILLIGRAM(S): at 11:41

## 2020-09-24 RX ADMIN — MAGNESIUM HYDROXIDE 30 MILLILITER(S): 400 TABLET, CHEWABLE ORAL at 13:19

## 2020-09-24 RX ADMIN — PANTOPRAZOLE SODIUM 40 MILLIGRAM(S): 20 TABLET, DELAYED RELEASE ORAL at 07:21

## 2020-09-24 RX ADMIN — AMLODIPINE BESYLATE 5 MILLIGRAM(S): 2.5 TABLET ORAL at 05:06

## 2020-09-24 RX ADMIN — HEPARIN SODIUM 5000 UNIT(S): 5000 INJECTION INTRAVENOUS; SUBCUTANEOUS at 13:19

## 2020-09-24 NOTE — DISCHARGE NOTE PROVIDER - NSDCACTIVITY_GEN_ALL_CORE
Do not drive or operate machinery/Walking - Outdoors allowed/Showering allowed/No heavy lifting/straining/Do not make important decisions/Stairs allowed/Walking - Indoors allowed

## 2020-09-24 NOTE — DISCHARGE NOTE PROVIDER - CARE PROVIDER_API CALL
Jacinto Rhodes  NEUROSURGERY  [address] [place] [address], [facility] [address]  [place] [address]  Phone: [phone]  Fax: [phone]  Follow Up Time:

## 2020-09-24 NOTE — PROGRESS NOTE ADULT - THIS PATIENT HAS THE FOLLOWING CONDITION(S)/DIAGNOSES ON THIS ADMISSION:
Brain Compression / Herniation
Cerebral Edema/Brain Compression / Herniation
None/Brain Compression / Herniation
None

## 2020-09-24 NOTE — DISCHARGE NOTE PROVIDER - NSDCMRMEDTOKEN_GEN_ALL_CORE_FT
acetaminophen 325 mg oral tablet: 2 tab(s) orally every 6 hours, As needed, Mild Pain (1 - 3)  amLODIPine 5 mg oral tablet: 1 tab(s) orally once a day  dexamethasone 2 mg oral tablet: 2 tab(s) orally q 8 hrs x 48 hrs, then 1 tab q 8 hrs x 48 hrs, then 1 tab q 12 hrs and continue   levETIRAcetam 500 mg oral tablet: 1 tab(s) orally 2 times a day  oxyCODONE 5 mg oral tablet: 1 tab(s) orally every 4 hours, As Needed -Moderate Pain (4 - 6) - for moderate pain - for severe pain MDD:6   pantoprazole 40 mg oral delayed release tablet: 1 tab(s) orally every 24 hours  senna oral tablet: 1 tab(s) orally once a day (at bedtime)  Trelegy Ellipta inhalation powder: 1 puff(s) inhaled once a day

## 2020-09-24 NOTE — DISCHARGE NOTE NURSING/CASE MANAGEMENT/SOCIAL WORK - PATIENT PORTAL LINK FT
You can access the FollowMyHealth Patient Portal offered by [facility] by registering at the following website: [email or web address]. By joining [facility]’s FollowMyHealth portal, you will also be able to view your health information using other applications (apps) compatible with our system.

## 2020-09-24 NOTE — PROGRESS NOTE ADULT - REASON FOR ADMISSION
Right crani for resection of Brain tumor

## 2020-09-24 NOTE — PROGRESS NOTE ADULT - SUBJECTIVE AND OBJECTIVE BOX
Hospital Course:   POD# 3 s/p right craniotomy for resection of brain mass      Vital Signs Last 24 Hrs  T(C): 36.3 (24 Sep 2020 05:00), Max: 37 (23 Sep 2020 13:40)  T(F): 97.3 (24 Sep 2020 05:00), Max: 98.6 (23 Sep 2020 13:40)  HR: 62 (24 Sep 2020 05:00) (62 - 67)  BP: 150/90 (24 Sep 2020 05:00) (107/62 - 150/90)  BP(mean): --  RR: 20 (24 Sep 2020 05:00) (18 - 20)  SpO2: --    I&O's Detail    23 Sep 2020 07:01  -  24 Sep 2020 07:00  --------------------------------------------------------  IN:    Oral Fluid: 260 mL  Total IN: 260 mL    OUT:  Total OUT: 0 mL    Total NET: 260 mL        I&O's Summary    23 Sep 2020 07:01  -  24 Sep 2020 07:00  --------------------------------------------------------  IN: 260 mL / OUT: 0 mL / NET: 260 mL        PHYSICAL EXAM:  Neurological: awake, alert, NAD, ambulating, steady gait, verbalizing well, good spirits, follows commands, no pain    Motor exam:         [x] Upper extremity              Bi(c5)  WE(c6)  EE(c7)   FF(c8)                                                R         5/5        5/5        5/5       5/5                                               L          5/5        5/5        5/5       5/5         [x] Lower extremeity          HF(l2)   KE(l3)    TA(l4)   EHL(l5)  GS(s1)                                                 R        5/5        5/5        5/5       5/5         5/5                                               L         5/5        5/5       5/5       5/5          5/5                                                  Incision/Wound: clean dry, staples intact, no evidence of infection    LABS:                        13.7   16.79 )-----------( 220      ( 23 Sep 2020 00:50 )             40.9     09-24    139  |  100  |  32<H>  ----------------------------<  109<H>  4.7   |  30  |  0.7    Ca    9.1      24 Sep 2020 07:49  Phos  3.8     09-23  Mg     2.0     09-24    CAPILLARY BLOOD GLUCOSE      POCT Blood Glucose.: 123 mg/dL (24 Sep 2020 11:39)  POCT Blood Glucose.: 148 mg/dL (23 Sep 2020 21:15)  POCT Blood Glucose.: 127 mg/dL (23 Sep 2020 16:25)      Drug Levels: [] N/A    CSF Analysis: [] N/A      Allergies    penicillin (Hives)    Intolerances      MEDICATIONS:  Antibiotics:    Neuro:  acetaminophen   Tablet .. 650 milliGRAM(s) Oral every 6 hours PRN  levETIRAcetam  IVPB 500 milliGRAM(s) IV Intermittent every 12 hours  oxyCODONE    IR 5 milliGRAM(s) Oral every 6 hours PRN  oxyCODONE    IR 10 milliGRAM(s) Oral every 6 hours PRN    Anticoagulation:  heparin   Injectable 5000 Unit(s) SubCutaneous every 8 hours    OTHER:  amLODIPine   Tablet 5 milliGRAM(s) Oral daily  budesonide  80 MICROgram(s)/formoterol 4.5 MICROgram(s) Inhaler 2 Puff(s) Inhalation two times a day  dexAMETHasone  Injectable   IV Push   dexAMETHasone  Injectable 4 milliGRAM(s) IV Push every 6 hours  dextrose 50% Injectable 25 Gram(s) IV Push once  insulin lispro (HumaLOG) corrective regimen sliding scale   SubCutaneous Before meals and at bedtime  magnesium hydroxide Suspension 30 milliLiter(s) Oral daily PRN  pantoprazole    Tablet 40 milliGRAM(s) Oral before breakfast  polyethylene glycol 3350 17 Gram(s) Oral daily  senna 1 Tablet(s) Oral at bedtime    IVF:    CULTURES:  Culture Results:   No growth (09-21 @ 19:29)    RADIOLOGY & ADDITIONAL TESTS: < from: CT Head No Cont (09.22.20 @ 09:47) >  IMPRESSION:    1.  Interval right parietal craniotomy with expected postsurgical changes as described including a right temporal lobe surgical cavity.    2.  Decreased mass effect with right-to-left midline shift measuring 3 mm, previously 10 mm on the preoperative study.      < end of copied text >        ASSESSMENT:  60y Male s/p right craniotomy for resection of brain mass    D49.6 / 76863, 10328    ^D49.6 / 03636, 90303    No pertinent family history in first degree relatives    Handoff    MEWS Score    Malignant neoplasm of upper lobe of left lung    Anxiety    COPD (chronic obstructive pulmonary disease)    Metastatic cancer to brain    Metastatic cancer to brain    Craniotomy, using frameless stereotaxy, for neoplasm excision    Brain mass    Craniotomy, using frameless stereotaxy, for neoplasm excision    Status post lung surgery    H/O spinal fusion    SysAdmin_VstLnk        PLAN: Patient in stable condition, discharge home today on decadron taper to 2mg q 12

## 2020-09-24 NOTE — DISCHARGE NOTE PROVIDER - NSDCCPTREATMENT_GEN_ALL_CORE_FT
PRINCIPAL PROCEDURE  Procedure: Craniotomy, using frameless stereotaxy, for neoplasm excision  Findings and Treatment:

## 2020-09-24 NOTE — DISCHARGE NOTE PROVIDER - HOSPITAL COURSE
59 y/o male with pasthistory of lung Ca admitted through same day surgery for craniotomy resection of brain mass on 9/21/20. Post op he was observed in ICU, post op head CT showed normal post op changes. Patient did well and was transferd to the regular floor, he continued to do well and he was discharged home with follow up with Dr Rhodes in 2 weeks. Pathology results pending. Patient wishes to follow up with his Oncologist DR Nunez and his Rad Onc DR Coto on [facility]. if needed. ( [phone]).

## 2020-09-27 LAB
CULTURE RESULTS: SIGNIFICANT CHANGE UP
SPECIMEN SOURCE: SIGNIFICANT CHANGE UP

## 2020-09-30 DIAGNOSIS — F41.9 ANXIETY DISORDER, UNSPECIFIED: ICD-10-CM

## 2020-09-30 DIAGNOSIS — E87.2 ACIDOSIS: ICD-10-CM

## 2020-09-30 DIAGNOSIS — G93.6 CEREBRAL EDEMA: ICD-10-CM

## 2020-09-30 DIAGNOSIS — G93.5 COMPRESSION OF BRAIN: ICD-10-CM

## 2020-09-30 DIAGNOSIS — Z85.110 PERSONAL HISTORY OF MALIGNANT CARCINOID TUMOR OF BRONCHUS AND LUNG: ICD-10-CM

## 2020-09-30 DIAGNOSIS — C79.31 SECONDARY MALIGNANT NEOPLASM OF BRAIN: ICD-10-CM

## 2020-09-30 DIAGNOSIS — Z88.0 ALLERGY STATUS TO PENICILLIN: ICD-10-CM

## 2020-09-30 DIAGNOSIS — I16.1 HYPERTENSIVE EMERGENCY: ICD-10-CM

## 2020-09-30 DIAGNOSIS — I10 ESSENTIAL (PRIMARY) HYPERTENSION: ICD-10-CM

## 2020-09-30 DIAGNOSIS — Z92.3 PERSONAL HISTORY OF IRRADIATION: ICD-10-CM

## 2020-09-30 DIAGNOSIS — J44.9 CHRONIC OBSTRUCTIVE PULMONARY DISEASE, UNSPECIFIED: ICD-10-CM

## 2020-09-30 DIAGNOSIS — Z92.21 PERSONAL HISTORY OF ANTINEOPLASTIC CHEMOTHERAPY: ICD-10-CM

## 2020-09-30 DIAGNOSIS — Z98.1 ARTHRODESIS STATUS: ICD-10-CM

## 2020-09-30 DIAGNOSIS — Z87.891 PERSONAL HISTORY OF NICOTINE DEPENDENCE: ICD-10-CM

## 2020-09-30 DIAGNOSIS — Z90.2 ACQUIRED ABSENCE OF LUNG [PART OF]: ICD-10-CM

## 2020-09-30 NOTE — ED POST DISCHARGE NOTE - RESULT SUMMARY
Core lab called for abnormal surgical pathology culture result. result aware but told tech she should contact Dr Rhodes for the result.

## 2021-05-24 PROBLEM — C34.12 MALIGNANT NEOPLASM OF UPPER LOBE, LEFT BRONCHUS OR LUNG: Chronic | Status: ACTIVE | Noted: 2020-09-21

## 2021-05-27 ENCOUNTER — APPOINTMENT (OUTPATIENT)
Age: 61
End: 2021-05-27

## 2021-05-27 DIAGNOSIS — R91.1 SOLITARY PULMONARY NODULE: ICD-10-CM

## 2021-05-27 RX ORDER — ALBUTEROL SULFATE 90 UG/1
108 (90 BASE) AEROSOL, METERED RESPIRATORY (INHALATION)
Refills: 0 | Status: ACTIVE | COMMUNITY

## 2021-05-27 RX ORDER — ALBUTEROL SULFATE 2.5 MG/3ML
(2.5 MG/3ML) SOLUTION RESPIRATORY (INHALATION)
Refills: 0 | Status: ACTIVE | COMMUNITY

## 2021-05-27 RX ORDER — PREDNISONE 10 MG/1
10 TABLET ORAL
Refills: 0 | Status: ACTIVE | COMMUNITY

## 2021-05-29 ENCOUNTER — APPOINTMENT (OUTPATIENT)
Age: 61
End: 2021-05-29
Payer: MEDICARE

## 2021-05-29 VITALS
HEIGHT: 70 IN | HEART RATE: 103 BPM | BODY MASS INDEX: 27.2 KG/M2 | OXYGEN SATURATION: 96 % | RESPIRATION RATE: 12 BRPM | WEIGHT: 190 LBS | SYSTOLIC BLOOD PRESSURE: 134 MMHG | DIASTOLIC BLOOD PRESSURE: 74 MMHG

## 2021-05-29 DIAGNOSIS — Z87.39 PERSONAL HISTORY OF OTHER DISEASES OF THE MUSCULOSKELETAL SYSTEM AND CONNECTIVE TISSUE: ICD-10-CM

## 2021-05-29 DIAGNOSIS — C34.90 MALIGNANT NEOPLASM OF UNSPECIFIED PART OF UNSPECIFIED BRONCHUS OR LUNG: ICD-10-CM

## 2021-05-29 DIAGNOSIS — J44.9 CHRONIC OBSTRUCTIVE PULMONARY DISEASE, UNSPECIFIED: ICD-10-CM

## 2021-05-29 DIAGNOSIS — Z87.891 PERSONAL HISTORY OF NICOTINE DEPENDENCE: ICD-10-CM

## 2021-05-29 PROCEDURE — 99214 OFFICE O/P EST MOD 30 MIN: CPT

## 2021-05-29 NOTE — REASON FOR VISIT
[Follow-Up] : a follow-up visit [Lung Cancer] : lung cancer [COPD] : COPD [Family Member] : family member

## 2021-05-29 NOTE — PHYSICAL EXAM
[No Acute Distress] : no acute distress [Normal Oropharynx] : normal oropharynx [Normal Appearance] : normal appearance [No Neck Mass] : no neck mass [Normal Rate/Rhythm] : normal rate/rhythm [Normal S1, S2] : normal s1, s2 [No Murmurs] : no murmurs [No Resp Distress] : no resp distress [Clear to Auscultation Bilaterally] : clear to auscultation bilaterally [Rhonchi] : rhonchi [No Clubbing] : no clubbing [No Cyanosis] : no cyanosis [TextBox_68] : tameka zhou [No Edema] : no edema [TextBox_99] : in wheelchair

## 2021-05-29 NOTE — HISTORY OF PRESENT ILLNESS
[Former] : former [TextBox_4] : here for pulm clearance for ls spine sx -dx of severe spinal stenosis

## 2021-05-29 NOTE — DISCUSSION/SUMMARY
[FreeTextEntry1] : mod risk for the planned procedure-from a pulm perspective-note hx of lung ca-sx,sx for brain met,copd-stable

## 2021-09-28 NOTE — ED ADULT NURSE NOTE - NSFALLRSKOUTCOME_ED_ALL_ED
Sciatica    Sciatica is pain, weakness, tingling, or loss of feeling (numbness) along the sciatic nerve. The sciatic nerve starts in the lower back and goes down the back of each leg. Sciatica usually goes away on its own or with treatment. Sometimes, sciatica may come back (recur).  What are the causes?  This condition happens when the sciatic nerve is pinched or has pressure put on it. This may be the result of:  · A disk in between the bones of the spine bulging out too far (herniated disk).  · Changes in the spinal disks that occur with aging.  · A condition that affects a muscle in the butt.  · Extra bone growth near the sciatic nerve.  · A break (fracture) of the area between your hip bones (pelvis).  · Pregnancy.  · Tumor. This is rare.  What increases the risk?  You are more likely to develop this condition if you:  · Play sports that put pressure or stress on the spine.  · Have poor strength and ease of movement (flexibility).  · Have had a back injury in the past.  · Have had back surgery.  · Sit for long periods of time.  · Do activities that involve bending or lifting over and over again.  · Are very overweight (obese).  What are the signs or symptoms?  Symptoms can vary from mild to very bad. They may include:  · Any of these problems in the lower back, leg, hip, or butt:  ? Mild tingling, loss of feeling, or dull aches.  ? Burning sensations.  ? Sharp pains.  · Loss of feeling in the back of the calf or the sole of the foot.  · Leg weakness.  · Very bad back pain that makes it hard to move.  These symptoms may get worse when you cough, sneeze, or laugh. They may also get worse when you sit or stand for long periods of time.  How is this treated?  This condition often gets better without any treatment. However, treatment may include:  · Changing or cutting back on physical activity when you have pain.  · Doing exercises and stretching.  · Putting ice or heat on the affected area.  · Medicines that  help:  ? To relieve pain and swelling.  ? To relax your muscles.  · Shots (injections) of medicines that help to relieve pain, irritation, and swelling.  · Surgery.  Follow these instructions at home:  Medicines  · Take over-the-counter and prescription medicines only as told by your doctor.  · Ask your doctor if the medicine prescribed to you:  ? Requires you to avoid driving or using heavy machinery.  ? Can cause trouble pooping (constipation). You may need to take these steps to prevent or treat trouble pooping:  § Drink enough fluids to keep your pee (urine) pale yellow.  § Take over-the-counter or prescription medicines.  § Eat foods that are high in fiber. These include beans, whole grains, and fresh fruits and vegetables.  § Limit foods that are high in fat and sugar. These include fried or sweet foods.  Managing pain         · If told, put ice on the affected area.  ? Put ice in a plastic bag.  ? Place a towel between your skin and the bag.  ? Leave the ice on for 20 minutes, 2-3 times a day.  · If told, put heat on the affected area. Use the heat source that your doctor tells you to use, such as a moist heat pack or a heating pad.  ? Place a towel between your skin and the heat source.  ? Leave the heat on for 20-30 minutes.  ? Remove the heat if your skin turns bright red. This is very important if you are unable to feel pain, heat, or cold. You may have a greater risk of getting burned.  Activity    · Return to your normal activities as told by your doctor. Ask your doctor what activities are safe for you.  · Avoid activities that make your symptoms worse.  · Take short rests during the day.  ? When you rest for a long time, do some physical activity or stretching between periods of rest.  ? Avoid sitting for a long time without moving. Get up and move around at least one time each hour.  · Exercise and stretch regularly, as told by your doctor.  · Do not lift anything that is heavier than 10 lb (4.5 kg)  while you have symptoms of sciatica.  ? Avoid lifting heavy things even when you do not have symptoms.  ? Avoid lifting heavy things over and over.  · When you lift objects, always lift in a way that is safe for your body. To do this, you should:  ? Bend your knees.  ? Keep the object close to your body.  ? Avoid twisting.    General instructions  · Stay at a healthy weight.  · Wear comfortable shoes that support your feet. Avoid wearing high heels.  · Avoid sleeping on a mattress that is too soft or too hard. You might have less pain if you sleep on a mattress that is firm enough to support your back.  · Keep all follow-up visits as told by your doctor. This is important.  Contact a doctor if:  · You have pain that:  ? Wakes you up when you are sleeping.  ? Gets worse when you lie down.  ? Is worse than the pain you have had in the past.  ? Lasts longer than 4 weeks.  · You lose weight without trying.  Get help right away if:  · You cannot control when you pee (urinate) or poop (have a bowel movement).  · You have weakness in any of these areas and it gets worse:  ? Lower back.  ? The area between your hip bones.  ? Butt.  ? Legs.  · You have redness or swelling of your back.  · You have a burning feeling when you pee.  Summary  · Sciatica is pain, weakness, tingling, or loss of feeling (numbness) along the sciatic nerve.  · This condition happens when the sciatic nerve is pinched or has pressure put on it.  · Sciatica can cause pain, tingling, or loss of feeling (numbness) in the lower back, legs, hips, and butt.  · Treatment often includes rest, exercise, medicines, and putting ice or heat on the affected area.  This information is not intended to replace advice given to you by your health care provider. Make sure you discuss any questions you have with your health care provider.  Document Revised: 01/06/2020 Document Reviewed: 01/06/2020  Elsevier Patient Education © 2021 [employer]     Fall with Harm Risk

## 2021-10-20 RX ORDER — FLUTICASONE FUROATE, UMECLIDINIUM BROMIDE AND VILANTEROL TRIFENATATE 100; 62.5; 25 UG/1; UG/1; UG/1
100-62.5-25 POWDER RESPIRATORY (INHALATION) DAILY
Qty: 1 | Refills: 6 | Status: ACTIVE | COMMUNITY
Start: 2021-05-29 | End: 1900-01-01

## 2021-10-26 RX ORDER — ALBUTEROL SULFATE 90 UG/1
108 (90 BASE) INHALANT RESPIRATORY (INHALATION)
Qty: 1 | Refills: 5 | Status: ACTIVE | COMMUNITY
Start: 2021-10-26 | End: 1900-01-01

## 2021-10-26 RX ORDER — FLUTICASONE FUROATE, UMECLIDINIUM BROMIDE AND VILANTEROL TRIFENATATE 100; 62.5; 25 UG/1; UG/1; UG/1
100-62.5-25 POWDER RESPIRATORY (INHALATION) DAILY
Qty: 1 | Refills: 6 | Status: ACTIVE | COMMUNITY
Start: 2021-10-26 | End: 1900-01-01

## 2021-11-11 ENCOUNTER — APPOINTMENT (OUTPATIENT)
Age: 61
End: 2021-11-11

## 2022-07-18 NOTE — ASU PATIENT PROFILE, ADULT - ALCOHOL USE HISTORY SINGLE SELECT
Anesthesia Evaluation     Patient summary reviewed and Nursing notes reviewed   no history of anesthetic complications:  NPO Solid Status: > 8 hours  NPO Liquid Status: > 8 hours           Airway   Mallampati: I  TM distance: >3 FB  Neck ROM: full  No difficulty expected  Dental      Pulmonary    (+) sleep apnea on CPAP,   Cardiovascular   Exercise tolerance: good (4-7 METS)    (+) hypertension,   (-) past MI, CAD      Neuro/Psych  (+) dizziness/light headedness,    (-) seizures, TIA, CVA  GI/Hepatic/Renal/Endo    (+)   diabetes mellitus (prediabetes) type 2,   (-) liver disease, no renal disease    Musculoskeletal     Abdominal    Substance History      OB/GYN          Other                          Anesthesia Plan    ASA 2     MAC     intravenous induction     Anesthetic plan, risks, benefits, and alternatives have been provided, discussed and informed consent has been obtained with: patient.      
never

## 2023-05-01 NOTE — ED ADULT NURSE NOTE - CHIEF COMPLAINT
Attempted to reach patient for the second time to convey urine culture results. No answer. Left message to return call. The patient is a 59y Male complaining of shortness of breath. Yes

## 2024-06-27 NOTE — ASU PATIENT PROFILE, ADULT - TEACHING/LEARNING RELIGIOUS CONSIDERATIONS
DOS 7/2/2024 for EXAM UNDER ANESTHESIA WITH BIOPSY OF ANUS OR RECTUM.   Pre procedural telephone interview complete with patient.  Patient verbalizes correct arrival time as instructed at 1000 at [facility], NPO status after midnight and medications to take NONE.  Patient verbalizes holding Aspirin, and Tadalafil as instructed.  Patient aware to do two fleets enemas in the morning before arrival if able to.  Patient instructed to bring a photo ID, insurance, card/s, and a list of current medications.  Patient instructed to shower with antibacterial soap, and NOT to wear any jewelry, lotions, or powders.  After hospital discharge patient will be going home with spouse Joycelyn and she will assume care overnight.  Patient acknowledged understanding to the plan of care and had no further questions/concerns.       Patient aware if they are ill (cough, fever, etc) to call their MD.  Patient aware of current coronavirus precautions for hospital and optional masking, current visitor policy and  parking no longer available  
none

## 2024-07-10 NOTE — ED ADULT NURSE NOTE - CAS DISCH TRANSFER METHOD
No care due was identified.  [facility] Embedded Care Due Messages. Reference number: [id number].   7/10/2024 9:48:36 AM CDT   Private car

## 2025-07-01 NOTE — PHYSICAL EXAM
[No Acute Distress] : no acute distress [Normal Oropharynx] : normal oropharynx [Normal Appearance] : normal appearance [No Neck Mass] : no neck mass [Normal Rate/Rhythm] : normal rate/rhythm [No Murmurs] : no murmurs [Normal S1, S2] : normal s1, s2 [No Resp Distress] : no resp distress [Clear to Auscultation Bilaterally] : clear to auscultation bilaterally [Rhonchi] : rhonchi [No Clubbing] : no clubbing [No Cyanosis] : no cyanosis [TextBox_68] : tameka zhou [No Edema] : no edema [TextBox_99] : in wheelchair dementia